# Patient Record
Sex: MALE | Race: ASIAN | NOT HISPANIC OR LATINO | URBAN - METROPOLITAN AREA
[De-identification: names, ages, dates, MRNs, and addresses within clinical notes are randomized per-mention and may not be internally consistent; named-entity substitution may affect disease eponyms.]

---

## 2024-02-10 ENCOUNTER — EMERGENCY (EMERGENCY)
Facility: HOSPITAL | Age: 64
LOS: 1 days | Discharge: HOME | End: 2024-02-10
Attending: INTERNAL MEDICINE | Admitting: INTERNAL MEDICINE
Payer: COMMERCIAL

## 2024-02-10 VITALS
WEIGHT: 220.02 LBS | OXYGEN SATURATION: 97 % | DIASTOLIC BLOOD PRESSURE: 94 MMHG | SYSTOLIC BLOOD PRESSURE: 147 MMHG | RESPIRATION RATE: 18 BRPM | HEART RATE: 90 BPM | TEMPERATURE: 98 F | HEIGHT: 74 IN

## 2024-02-10 PROCEDURE — 73562 X-RAY EXAM OF KNEE 3: CPT

## 2024-02-10 PROCEDURE — 99283 EMERGENCY DEPT VISIT LOW MDM: CPT | Mod: 25

## 2024-02-10 PROCEDURE — 99284 EMERGENCY DEPT VISIT MOD MDM: CPT

## 2024-02-10 PROCEDURE — 96372 THER/PROPH/DIAG INJ SC/IM: CPT

## 2024-02-10 PROCEDURE — 73562 X-RAY EXAM OF KNEE 3: CPT | Mod: 26,LT

## 2024-02-10 RX ORDER — KETOROLAC TROMETHAMINE 30 MG/ML
30 SYRINGE (ML) INJECTION ONCE
Refills: 0 | Status: DISCONTINUED | OUTPATIENT
Start: 2024-02-10 | End: 2024-02-10

## 2024-02-10 RX ADMIN — Medication 30 MILLIGRAM(S): at 02:26

## 2024-02-10 NOTE — ED PROVIDER NOTE - PATIENT PORTAL LINK FT
You can access the FollowMyHealth Patient Portal offered by [facility] by registering at the following website: [email or web address]. By joining [facility]’s FollowMyHealth portal, you will also be able to view your health information using other applications (apps) compatible with our system.

## 2024-02-10 NOTE — ED ADULT NURSE REASSESSMENT NOTE - NS ED NURSE REASSESS COMMENT FT1
Patient seen for left knee pain and swelling. The symptoms began yesterday, patient denies any trauma. Toradol and percocet administered with relief. Patient is discharge to home, he is able to ambulate. Knee immobilizer in place.

## 2024-02-10 NOTE — ED ADULT TRIAGE NOTE - CHIEF COMPLAINT QUOTE
Patient states that after work today he became unable to move his left knee.  Patient states knee is very painful.  Patient has a surgical scar and reports of multiple surgeries. Swelling noted to knee.

## 2024-02-10 NOTE — ED ADULT TRIAGE NOTE - WEIGHT IN KG
Pt given discharge teaching as well as prescription  Pt had no questions for the RN  Pt walked out to front door with RN and family members   Gavin Honeycutt RN
99.8

## 2024-02-10 NOTE — ED PROVIDER NOTE - CLINICAL SUMMARY MEDICAL DECISION MAKING FREE TEXT BOX
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

## 2024-02-10 NOTE — ED PROVIDER NOTE - CARE PROVIDER_API CALL
ambulatory Lazaro Kelly  Orthopaedic Surgery  [address], [address]  [place] [address]  Phone: [phone]  Fax: [phone]  Follow Up Time: 1-3 Days

## 2024-02-10 NOTE — ED ADULT TRIAGE NOTE - IDEAL BODY WEIGHT(KG)
82 A-T Advancement Flap Text: The defect edges were debeveled with a #15 scalpel blade.  Given the location of the defect, shape of the defect and the proximity to free margins an A-T advancement flap was deemed most appropriate.  Using a sterile surgical marker, an appropriate advancement flap was drawn incorporating the defect and placing the expected incisions within the relaxed skin tension lines where possible.    The area thus outlined was incised deep to adipose tissue with a #15 scalpel blade.  The skin margins were undermined to an appropriate distance in all directions utilizing iris scissors.

## 2024-02-10 NOTE — ED PROVIDER NOTE - OBJECTIVE STATEMENT
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

## 2024-02-10 NOTE — ED PROVIDER NOTE - NSFOLLOWUPINSTRUCTIONS_ED_ALL_ED_FT
Take Naprosyn 500mg twice daily  Follow up with your Knee surgeon  or the referral provided tomorrow

## 2024-02-16 ENCOUNTER — APPOINTMENT (OUTPATIENT)
Dept: ORTHOPEDIC SURGERY | Facility: CLINIC | Age: 64
End: 2024-02-16
Payer: COMMERCIAL

## 2024-02-16 VITALS
OXYGEN SATURATION: 98 % | HEART RATE: 65 BPM | SYSTOLIC BLOOD PRESSURE: 146 MMHG | HEIGHT: 74 IN | BODY MASS INDEX: 26.95 KG/M2 | DIASTOLIC BLOOD PRESSURE: 86 MMHG | WEIGHT: 210 LBS | TEMPERATURE: 97.8 F

## 2024-02-16 DIAGNOSIS — Z86.39 PERSONAL HISTORY OF OTHER ENDOCRINE, NUTRITIONAL AND METABOLIC DISEASE: ICD-10-CM

## 2024-02-16 DIAGNOSIS — M25.562 PAIN IN LEFT KNEE: ICD-10-CM

## 2024-02-16 DIAGNOSIS — Z78.9 OTHER SPECIFIED HEALTH STATUS: ICD-10-CM

## 2024-02-16 DIAGNOSIS — M79.641 PAIN IN RIGHT HAND: ICD-10-CM

## 2024-02-16 PROBLEM — Z00.00 ENCOUNTER FOR PREVENTIVE HEALTH EXAMINATION: Status: ACTIVE | Noted: 2024-02-16

## 2024-02-16 PROCEDURE — 73130 X-RAY EXAM OF HAND: CPT | Mod: RT

## 2024-02-16 PROCEDURE — 73564 X-RAY EXAM KNEE 4 OR MORE: CPT | Mod: LT

## 2024-02-16 PROCEDURE — 99204 OFFICE O/P NEW MOD 45 MIN: CPT

## 2024-02-16 RX ORDER — ATORVASTATIN CALCIUM 10 MG/1
10 TABLET, FILM COATED ORAL
Refills: 0 | Status: ACTIVE | COMMUNITY

## 2024-02-16 RX ORDER — OMEPRAZOLE 20 MG/1
20 CAPSULE, DELAYED RELEASE ORAL
Refills: 0 | Status: ACTIVE | COMMUNITY

## 2024-02-16 NOTE — HISTORY OF PRESENT ILLNESS
[de-identified] : 63-year-old male  presents for initial evaluation of left knee pain x 1 week. Denies trauma or injury. He complains of constant aching pain worse with prolonged walking, standing, bending the knee, sitting, and climbing stairs. He takes Tylenol for pain with some relief. He was advised by his PCP to avoid NSAIDs. He went to [facility] and had x-rays which were negative. He followed up with the office of his prior surgeon and had the knee aspirated. He has a history of left TKR in 2021 with Dr. Esparza.  He had some sort of revision procedure in 2022.  He is unsure what was done at that surgery but states that he has had pain in his knee since.  Earlier this week he was seen by Dr. Esparza and had bloody fluid aspirated from his knee.  He does not have results of that aspiration. He is also complaining of pain in his right hand without any injury.  The pain is diffusely in the metacarpal region.  He denies numbness or tingling or prior difficulty with his hand.

## 2024-02-16 NOTE — PHYSICAL EXAM
[UE/LE] : Sensory: Intact in bilateral upper & lower extremities [DP] : dorsalis pedis 2+ and symmetric bilaterally [PT] : posterior tibial 2+ and symmetric bilaterally [Rad] : radial 2+ and symmetric bilaterally [Normal] : Alert and in no acute distress [Poor Appearance] : well-appearing [Acute Distress] : not in acute distress [Obese] : not obese [de-identified] : The patient has no respiratory distress. Mood and affect are normal. The patient is alert and oriented to person, place and time. There is no pain with motion of the shoulders or the elbows.  Examination of the right hand demonstrates no tenderness, no swelling and no deformity.  Tendon function is intact.  Sensory exam is intact.  He reports mild pain with wrist range of motion. Lower extremity examination demonstrates no pain with motion of the hips.  There is no tenderness of either hip.  Examination of the knees demonstrates scar on the anterior aspect of the left knee.  There is no drainage or evidence of infection.  There is no instability.  Quadriceps function is intact.  Range of motion 0 to 100 degrees on the left.  The calves are soft and nontender.  The skin is intact.  There is no lymphedema. [de-identified] : ACC: [id number] EXAM: XR KNEE AP LAT OBL 3 VIEWS LT ORDERED BY: NISREEN MCKEON PROCEDURE DATE: 02/10/2024 INTERPRETATION: Clinical history: Pain and swelling  Left knee 3 views  Left total knee replacement is seen. No acute fracture or dislocation. Joint effusion is present. Generalized soft tissue swelling is seen.  IMPRESSION: Soft tissue swelling with joint effusion  AJ JAMES MD; Attending Radiologist This document has been electronically signed. Feb 10 2024 11:43AM  AP, lateral and sunrise x-rays of the left knee taken today demonstrate total knee replacement in good position.  There is no obvious evidence of loosening.  Previously taken x-rays are not available for my review. AP, lateral and oblique x-rays of the right hand demonstrate no fracture, no dislocation and no bony abnormality.

## 2024-02-16 NOTE — DISCUSSION/SUMMARY
[de-identified] : The patient is having pain in the left knee following knee replacement.  He initially was very comfortable but at the second procedure he has developed pain.  It is unclear what the second procedure was.  He had his knee aspirated by the treating surgeon earlier this week and we do not yet have results from that aspiration.  I have advised him that I am not sure of the cause of his pain but we will speak to him once we get results of this weeks aspiration.  In terms of his hand I find no pathology.  He likely had a sprain from overuse.  We will speak with him once further information has been obtained.

## 2024-03-03 ENCOUNTER — EMERGENCY (EMERGENCY)
Facility: HOSPITAL | Age: 64
LOS: 1 days | Discharge: HOME | End: 2024-03-03
Attending: STUDENT IN AN ORGANIZED HEALTH CARE EDUCATION/TRAINING PROGRAM | Admitting: STUDENT IN AN ORGANIZED HEALTH CARE EDUCATION/TRAINING PROGRAM
Payer: COMMERCIAL

## 2024-03-03 VITALS
DIASTOLIC BLOOD PRESSURE: 94 MMHG | TEMPERATURE: 97 F | HEART RATE: 77 BPM | WEIGHT: 210.1 LBS | RESPIRATION RATE: 18 BRPM | OXYGEN SATURATION: 94 % | HEIGHT: 74 IN | SYSTOLIC BLOOD PRESSURE: 139 MMHG

## 2024-03-03 VITALS
TEMPERATURE: 98 F | RESPIRATION RATE: 18 BRPM | SYSTOLIC BLOOD PRESSURE: 156 MMHG | OXYGEN SATURATION: 98 % | HEART RATE: 62 BPM | DIASTOLIC BLOOD PRESSURE: 91 MMHG

## 2024-03-03 LAB
ALBUMIN SERPL ELPH-MCNC: 3.2 G/DL — LOW (ref 3.3–5)
ALP SERPL-CCNC: 84 U/L — SIGNIFICANT CHANGE UP (ref 40–120)
ALT FLD-CCNC: 26 U/L — SIGNIFICANT CHANGE UP (ref 12–78)
ANION GAP SERPL CALC-SCNC: 8 MMOL/L — SIGNIFICANT CHANGE UP (ref 5–17)
APTT BLD: 34.1 SEC — SIGNIFICANT CHANGE UP (ref 24.5–35.6)
AST SERPL-CCNC: 27 U/L — SIGNIFICANT CHANGE UP (ref 15–37)
BASOPHILS # BLD AUTO: 0.05 K/UL — SIGNIFICANT CHANGE UP (ref 0–0.2)
BASOPHILS NFR BLD AUTO: 0.7 % — SIGNIFICANT CHANGE UP (ref 0–2)
BILIRUB SERPL-MCNC: 0.9 MG/DL — SIGNIFICANT CHANGE UP (ref 0.2–1.2)
BUN SERPL-MCNC: 13 MG/DL — SIGNIFICANT CHANGE UP (ref 7–23)
CALCIUM SERPL-MCNC: 8.6 MG/DL — SIGNIFICANT CHANGE UP (ref 8.5–10.1)
CHLORIDE SERPL-SCNC: 112 MMOL/L — HIGH (ref 96–108)
CO2 SERPL-SCNC: 23 MMOL/L — SIGNIFICANT CHANGE UP (ref 22–31)
CREAT SERPL-MCNC: 1.1 MG/DL — SIGNIFICANT CHANGE UP (ref 0.5–1.3)
D DIMER BLD IA.RAPID-MCNC: 2192 NG/ML DDU — HIGH
EGFR: 76 ML/MIN/1.73M2 — SIGNIFICANT CHANGE UP
EOSINOPHIL # BLD AUTO: 0.19 K/UL — SIGNIFICANT CHANGE UP (ref 0–0.5)
EOSINOPHIL NFR BLD AUTO: 2.5 % — SIGNIFICANT CHANGE UP (ref 0–6)
GLUCOSE SERPL-MCNC: 94 MG/DL — SIGNIFICANT CHANGE UP (ref 70–99)
HCT VFR BLD CALC: 47.2 % — SIGNIFICANT CHANGE UP (ref 39–50)
HGB BLD-MCNC: 16 G/DL — SIGNIFICANT CHANGE UP (ref 13–17)
IMM GRANULOCYTES NFR BLD AUTO: 0.3 % — SIGNIFICANT CHANGE UP (ref 0–0.9)
INR BLD: 0.93 RATIO — SIGNIFICANT CHANGE UP (ref 0.85–1.18)
LYMPHOCYTES # BLD AUTO: 2.63 K/UL — SIGNIFICANT CHANGE UP (ref 1–3.3)
LYMPHOCYTES # BLD AUTO: 34.6 % — SIGNIFICANT CHANGE UP (ref 13–44)
MCHC RBC-ENTMCNC: 30.1 PG — SIGNIFICANT CHANGE UP (ref 27–34)
MCHC RBC-ENTMCNC: 33.9 GM/DL — SIGNIFICANT CHANGE UP (ref 32–36)
MCV RBC AUTO: 88.7 FL — SIGNIFICANT CHANGE UP (ref 80–100)
MONOCYTES # BLD AUTO: 0.75 K/UL — SIGNIFICANT CHANGE UP (ref 0–0.9)
MONOCYTES NFR BLD AUTO: 9.9 % — SIGNIFICANT CHANGE UP (ref 2–14)
NEUTROPHILS # BLD AUTO: 3.96 K/UL — SIGNIFICANT CHANGE UP (ref 1.8–7.4)
NEUTROPHILS NFR BLD AUTO: 52 % — SIGNIFICANT CHANGE UP (ref 43–77)
NRBC # BLD: 0 /100 WBCS — SIGNIFICANT CHANGE UP (ref 0–0)
PLATELET # BLD AUTO: 298 K/UL — SIGNIFICANT CHANGE UP (ref 150–400)
POTASSIUM SERPL-MCNC: 4 MMOL/L — SIGNIFICANT CHANGE UP (ref 3.5–5.3)
POTASSIUM SERPL-SCNC: 4 MMOL/L — SIGNIFICANT CHANGE UP (ref 3.5–5.3)
PROT SERPL-MCNC: 7.4 G/DL — SIGNIFICANT CHANGE UP (ref 6–8.3)
PROTHROM AB SERPL-ACNC: 10.9 SEC — SIGNIFICANT CHANGE UP (ref 9.5–13)
RBC # BLD: 5.32 M/UL — SIGNIFICANT CHANGE UP (ref 4.2–5.8)
RBC # FLD: 13.5 % — SIGNIFICANT CHANGE UP (ref 10.3–14.5)
SODIUM SERPL-SCNC: 143 MMOL/L — SIGNIFICANT CHANGE UP (ref 135–145)
TROPONIN I, HIGH SENSITIVITY RESULT: 19.8 NG/L — SIGNIFICANT CHANGE UP
TROPONIN I, HIGH SENSITIVITY RESULT: 19.8 NG/L — SIGNIFICANT CHANGE UP
WBC # BLD: 7.6 K/UL — SIGNIFICANT CHANGE UP (ref 3.8–10.5)
WBC # FLD AUTO: 7.6 K/UL — SIGNIFICANT CHANGE UP (ref 3.8–10.5)

## 2024-03-03 PROCEDURE — 93005 ELECTROCARDIOGRAM TRACING: CPT

## 2024-03-03 PROCEDURE — 93970 EXTREMITY STUDY: CPT | Mod: 26

## 2024-03-03 PROCEDURE — 93010 ELECTROCARDIOGRAM REPORT: CPT

## 2024-03-03 PROCEDURE — 84484 ASSAY OF TROPONIN QUANT: CPT

## 2024-03-03 PROCEDURE — 99285 EMERGENCY DEPT VISIT HI MDM: CPT | Mod: 25

## 2024-03-03 PROCEDURE — 71275 CT ANGIOGRAPHY CHEST: CPT | Mod: 26,MC

## 2024-03-03 PROCEDURE — 80053 COMPREHEN METABOLIC PANEL: CPT

## 2024-03-03 PROCEDURE — 36415 COLL VENOUS BLD VENIPUNCTURE: CPT

## 2024-03-03 PROCEDURE — 71275 CT ANGIOGRAPHY CHEST: CPT | Mod: MC

## 2024-03-03 PROCEDURE — 85025 COMPLETE CBC W/AUTO DIFF WBC: CPT

## 2024-03-03 PROCEDURE — 93970 EXTREMITY STUDY: CPT

## 2024-03-03 PROCEDURE — 99285 EMERGENCY DEPT VISIT HI MDM: CPT

## 2024-03-03 PROCEDURE — 71045 X-RAY EXAM CHEST 1 VIEW: CPT

## 2024-03-03 PROCEDURE — 85730 THROMBOPLASTIN TIME PARTIAL: CPT

## 2024-03-03 PROCEDURE — 96374 THER/PROPH/DIAG INJ IV PUSH: CPT | Mod: XU

## 2024-03-03 PROCEDURE — 85379 FIBRIN DEGRADATION QUANT: CPT

## 2024-03-03 PROCEDURE — 71045 X-RAY EXAM CHEST 1 VIEW: CPT | Mod: 26

## 2024-03-03 PROCEDURE — 85610 PROTHROMBIN TIME: CPT

## 2024-03-03 RX ORDER — KETOROLAC TROMETHAMINE 30 MG/ML
30 SYRINGE (ML) INJECTION ONCE
Refills: 0 | Status: DISCONTINUED | OUTPATIENT
Start: 2024-03-03 | End: 2024-03-03

## 2024-03-03 RX ORDER — SODIUM CHLORIDE 9 MG/ML
1000 INJECTION INTRAMUSCULAR; INTRAVENOUS; SUBCUTANEOUS ONCE
Refills: 0 | Status: COMPLETED | OUTPATIENT
Start: 2024-03-03 | End: 2024-03-03

## 2024-03-03 RX ADMIN — SODIUM CHLORIDE 1000 MILLILITER(S): 9 INJECTION INTRAMUSCULAR; INTRAVENOUS; SUBCUTANEOUS at 14:09

## 2024-03-03 RX ADMIN — Medication 30 MILLIGRAM(S): at 16:17

## 2024-03-03 NOTE — ED ADULT NURSE NOTE - OBJECTIVE STATEMENT
63yr old male a&ox4 arrives to ED from home c/o chest pain, pt notes pain to be consent with no relief, pt notes no cardiac history and that chest pain woke pt up out of sleep. pt notes active sob, EKG completed, iv placed. Pt placed on bedside cardiac monitor. Magaly LANGE

## 2024-03-03 NOTE — ED PROVIDER NOTE - PATIENT PORTAL LINK FT
You can access the FollowMyHealth Patient Portal offered by [facility] by registering at the following website: [email or web address]. By joining [facility]’s FollowMyHealth portal, you will also be able to view your health information using other applications (apps) compatible with our system.

## 2024-03-03 NOTE — ED PROVIDER NOTE - NSFOLLOWUPINSTRUCTIONS_ED_ALL_ED_FT
Follow-up with your PCP for reevaluation, ongoing care and treatment. Follow up for further workup and evaluation of incidental findings on ct scan.  Follow-up with cardiologist.  Take Tylenol or Motrin with food as directed for pain.  If having worsening symptoms or other related symptoms, return to the ER immediately.    Nonspecific Chest Pain, Adult  Chest pain is an uncomfortable, tight, or painful feeling in the chest. The pain can feel like a crushing, aching, or squeezing pressure. A person can feel a burning or tingling sensation. Chest pain can also be felt in your back, neck, jaw, shoulder, or arm. This pain can be worse when you move, sneeze, or take a deep breath.    Chest pain can be caused by a condition that is life-threatening. This must be treated right away. It can also be caused by something that is not life-threatening. If you have chest pain, it can be hard to know the difference, so it is important to get help right away to make sure that you do not have a serious condition.    Some life-threatening causes of chest pain include:  Heart attack.  A tear in the body's main blood vessel (aortic dissection).  Inflammation around your heart (pericarditis).  A problem in the lungs, such as a blood clot (pulmonary embolism) or a collapsed lung (pneumothorax).  Some non life-threatening causes of chest pain include:  Heartburn.  Anxiety or stress.  Damage to the bones, muscles, and cartilage that make up your chest wall.  Pneumonia or bronchitis.  Shingles infection (varicella-zoster virus).  Your chest pain may come and go. It may also be constant. Your health care provider will do tests and other studies to find the cause of your pain. Treatment will depend on the cause of your chest pain.    Follow these instructions at home:  Medicines    Take over-the-counter and prescription medicines only as told by your health care provider.  If you were prescribed an antibiotic medicine, take it as told by your health care provider. Do not stop taking the antibiotic even if you start to feel better.  Activity    Avoid any activities that cause chest pain.  Do not lift anything that is heavier than 10 lb (4.5 kg), or the limit that you are told, until your health care provider says that it is safe.  Rest as directed by your health care provider.  Return to your normal activities only as told by your health care provider. Ask your health care provider what activities are safe for you.  Lifestyle    A plate along with examples of foods in a healthy diet.  Silhouette of a person sitting on the floor doing yoga.  Do not use any products that contain nicotine or tobacco, such as cigarettes, e-cigarettes, and chewing tobacco. If you need help quitting, ask your health care provider.  Do not drink alcohol.  Make healthy lifestyle changes as recommended. These may include:  Getting regular exercise. Ask your health care provider to suggest some exercises that are safe for you.  Eating a heart-healthy diet. This includes plenty of fresh fruits and vegetables, whole grains, low-fat (lean) protein, and low-fat dairy products. A dietitian can help you find healthy eating options.  Maintaining a healthy weight.  Managing any other health conditions you may have, such as high blood pressure (hypertension) or diabetes.  Reducing stress, such as with yoga or relaxation techniques.  General instructions    Pay attention to any changes in your symptoms.  It is up to you to get the results of any tests that were done. Ask your health care provider, or the department that is doing the tests, when your results will be ready.  Keep all follow-up visits as told by your health care provider. This is important.  You may be asked to go for further testing if your chest pain does not go away.  Contact a health care provider if:  Your chest pain does not go away.  You feel depressed.  You have a fever.  You notice changes in your symptoms or develop new symptoms.  Get help right away if:  Your chest pain gets worse.  You have a cough that gets worse, or you cough up blood.  You have severe pain in your abdomen.  You faint.  You have sudden, unexplained chest discomfort.  You have sudden, unexplained discomfort in your arms, back, neck, or jaw.  You have shortness of breath at any time.  You suddenly start to sweat, or your skin gets clammy.  You feel nausea or you vomit.  You suddenly feel lightheaded or dizzy.  You have severe weakness, or unexplained weakness or fatigue.  Your heart begins to beat quickly, or it feels like it is skipping beats.  These symptoms may represent a serious problem that is an emergency. Do not wait to see if the symptoms will go away. Get medical help right away. Call your local emergency services (911 in the [place] Do not drive yourself to the hospital.    Summary  Chest pain can be caused by a condition that is serious and requires urgent treatment. It may also be caused by something that is not life-threatening.  Your health care provider may do lab tests and other studies to find the cause of your pain.  Follow your health care provider's instructions on taking medicines, making lifestyle changes, and getting emergency treatment if symptoms become worse.  Keep all follow-up visits as told by your health care provider. This includes visits for any further testing if your chest pain does not go away.

## 2024-03-03 NOTE — ED ADULT TRIAGE NOTE - GLASGOW COMA SCALE: EYE OPENING, MLM
Pulmonary Progress Note  CC: COVID, COPD    Subjective:  Pt did not use Bipap all of the prior night  Episode of hypoxia this morning. O2 increased to 7lpm    EXAM: BP (!) 184/78   Pulse 90   Temp 98 °F (36.7 °C) (Axillary)   Resp 18   Wt 229 lb 1.6 oz (103.9 kg)   SpO2 91%   BMI 36.98 kg/m²  on 7L  Constitutional:  No acute distress. Eyes: PERRL. Conjunctivae anicteric. ENT: Normal nose. Normal tongue. Neck:  Trachea is midline. No thyroid tenderness. Respiratory: No accessory muscle usage. decreased breath sounds. No wheezes. + rales. No Rhonchi. Cardiovascular: Normal S1S2. No digit clubbing. No digit cyanosis. No LE edema. Psychiatric: No anxiety or Agitation. Alert and Oriented to person, place and time.     Scheduled Meds:   gabapentin  400 mg Oral BID    guaiFENesin  600 mg Oral BID    apixaban  5 mg Oral BID    dilTIAZem  120 mg Oral Daily    ferrous sulfate  325 mg Oral Daily with breakfast    pantoprazole  40 mg Oral QAM AC    therapeutic multivitamin-minerals  1 tablet Oral Daily    pravastatin  10 mg Oral QPM    QUEtiapine  400 mg Oral Nightly    rOPINIRole  4 mg Oral Nightly    torsemide  20 mg Oral Daily    sodium chloride flush  5-40 mL IntraVENous 2 times per day    predniSONE  40 mg Oral Daily    albuterol sulfate HFA  2 puff Inhalation 4x daily    And    ipratropium  2 puff Inhalation 4x daily    venlafaxine  225 mg Oral Daily    azithromycin  250 mg Oral Daily     Continuous Infusions:   sodium chloride       PRN Meds:  ALPRAZolam, HYDROcodone-acetaminophen, guaiFENesin-dextromethorphan, sodium chloride flush, sodium chloride, polyethylene glycol, acetaminophen **OR** acetaminophen, prochlorperazine, albuterol sulfate HFA, magnesium sulfate    Labs:  CBC:   Recent Labs     02/05/23 2153 02/06/23 0417   WBC 5.3 5.2   HGB 11.5* 11.3*   HCT 34.8* 34.7*   MCV 91.4 95.6    267       BMP:   Recent Labs     02/05/23 2153 02/06/23 0417    134*   K 4.2 3.6   CL 93* 95* CO2 32 26   BUN 10 10   CREATININE 0.6 0.6       Chest imaging was reviewed by me and showed 2/5/23 CXR  Chronic airspace changes have slightly progressed in the right lung suspected   to represent pleural thickening and pleural fluid along with edema versus   atelectasis. Lesser pattern of interstitial change slightly increased on the   left. A superimposed viral pattern of pneumonitis may be considered   clinically. ASSESSMENT:  Acute on chronic hypoxic respiratory failure   COPD exacerbation  COVID 19 disease  MEG on home Bipap     PLAN:  Droplet Plus Airborne Precautions   Bipap PRN and QHS - pt told she can use home unit if available  Supplemental oxygen to maintain SaO2 >92%; wean as tolerated  Intensive inhaled bronchodilator therapy. Change IV to prednisone taper  Zpack  Sputum GS&C. (E4) spontaneous

## 2024-03-03 NOTE — ED PROVIDER NOTE - CARE PROVIDER_API CALL
John Montoya  Cardiology  [address] [place] [address]  Phone: [phone]  Fax: [phone]  Follow Up Time: 1-3 Days    LESTER NICOLE  [address] [place] [address]  Phone: ([phone]  Fax: ()-  Follow Up Time: 1-3 Days

## 2024-03-03 NOTE — ED PROVIDER NOTE - OBJECTIVE STATEMENT
63-year-old male with history of hyperlipidemia on aspirin presents with complaint of chest pain since 2 AM today.  Patient states that he has had nonradiating constant midsternal chest pressure, worse with certain movements and exertion since 2am today. Took dose of tylenol at 2:30am without relief.  Denies fever, cough, shortness of breath, palpitations, numbness, tingling, N/V, abdominal pain, calf pain/swelling, recent travel/immobilization, hx of dvt/pe or other symptoms.  pcp: Dr. Kehinde Ruiz

## 2024-03-03 NOTE — ED PROVIDER NOTE - CLINICAL SUMMARY MEDICAL DECISION MAKING FREE TEXT BOX
64yo M otherwise healthy pw midsternal chest pain and pressure since 2am this morning waking him from sleep. no assoc sob, nausea, vomiting or other symptoms. pt states pain is worse when he twists his torso from side to side and when he walks. does not radiate anywhere.   pt well appearing, ekg no acute ischemic cahnges, will check labs, trop ro acs, ro pna, ro ptx, labs,  trop, xray  reassess

## 2024-03-03 NOTE — ED PROVIDER NOTE - MUSCULOSKELETAL, MLM
Spine appears normal, range of motion is not limited, no muscle or joint tenderness, calf/LE B NT without swelling

## 2024-03-03 NOTE — ED PROVIDER NOTE - CARE PROVIDERS DIRECT ADDRESSES
,[email or web address],DirectAddress_Unknown

## 2024-03-03 NOTE — ED PROVIDER NOTE - PROVIDER TOKENS
PROVIDER:[TOKEN:[[id number]],FOLLOWUP:[1-3 Days]],PROVIDER:[TOKEN:[[id number]],FOLLOWUP:[1-3 Days]]

## 2024-03-03 NOTE — ED PROVIDER NOTE - PROGRESS NOTE DETAILS
Reevaluated patient at bedside.  Patient feeling much improved and pain subsided after toradol. advised to f/u with pcp and cards. Informed about incidental findings on ct scan and advised to f/u with pcp for further workup and eval. Discussed the results of all diagnostic testing in ED and copies of all reports given.   An opportunity to ask questions was given.  Discussed the importance of prompt, close medical follow-up.  Patient will return with any changes, concerns or persistent / worsening symptoms.  Understanding of all instructions verbalized.

## 2024-05-22 ENCOUNTER — EMERGENCY (EMERGENCY)
Facility: HOSPITAL | Age: 64
LOS: 1 days | Discharge: HOME | End: 2024-05-22
Attending: EMERGENCY MEDICINE | Admitting: EMERGENCY MEDICINE
Payer: COMMERCIAL

## 2024-05-22 VITALS
SYSTOLIC BLOOD PRESSURE: 160 MMHG | WEIGHT: 214.95 LBS | HEIGHT: 74 IN | OXYGEN SATURATION: 96 % | DIASTOLIC BLOOD PRESSURE: 104 MMHG | TEMPERATURE: 98 F | HEART RATE: 84 BPM | RESPIRATION RATE: 18 BRPM

## 2024-05-22 PROCEDURE — 93010 ELECTROCARDIOGRAM REPORT: CPT

## 2024-05-22 PROCEDURE — 99053 MED SERV 10PM-8AM 24 HR FAC: CPT

## 2024-05-22 PROCEDURE — 99285 EMERGENCY DEPT VISIT HI MDM: CPT

## 2024-05-23 LAB
ALBUMIN SERPL ELPH-MCNC: 3.1 G/DL — LOW (ref 3.3–5)
ALP SERPL-CCNC: 97 U/L — SIGNIFICANT CHANGE UP (ref 40–120)
ALT FLD-CCNC: 30 U/L — SIGNIFICANT CHANGE UP (ref 12–78)
ANION GAP SERPL CALC-SCNC: 5 MMOL/L — SIGNIFICANT CHANGE UP (ref 5–17)
APTT BLD: 33.6 SEC — SIGNIFICANT CHANGE UP (ref 24.5–35.6)
AST SERPL-CCNC: 21 U/L — SIGNIFICANT CHANGE UP (ref 15–37)
BASOPHILS # BLD AUTO: 0.05 K/UL — SIGNIFICANT CHANGE UP (ref 0–0.2)
BASOPHILS NFR BLD AUTO: 0.5 % — SIGNIFICANT CHANGE UP (ref 0–2)
BILIRUB SERPL-MCNC: 0.6 MG/DL — SIGNIFICANT CHANGE UP (ref 0.2–1.2)
BUN SERPL-MCNC: 16 MG/DL — SIGNIFICANT CHANGE UP (ref 7–23)
CALCIUM SERPL-MCNC: 8.8 MG/DL — SIGNIFICANT CHANGE UP (ref 8.5–10.1)
CHLORIDE SERPL-SCNC: 106 MMOL/L — SIGNIFICANT CHANGE UP (ref 96–108)
CO2 SERPL-SCNC: 26 MMOL/L — SIGNIFICANT CHANGE UP (ref 22–31)
CREAT SERPL-MCNC: 1.2 MG/DL — SIGNIFICANT CHANGE UP (ref 0.5–1.3)
EGFR: 68 ML/MIN/1.73M2 — SIGNIFICANT CHANGE UP
EOSINOPHIL # BLD AUTO: 0.13 K/UL — SIGNIFICANT CHANGE UP (ref 0–0.5)
EOSINOPHIL NFR BLD AUTO: 1.3 % — SIGNIFICANT CHANGE UP (ref 0–6)
GLUCOSE SERPL-MCNC: 99 MG/DL — SIGNIFICANT CHANGE UP (ref 70–99)
HCT VFR BLD CALC: 43.3 % — SIGNIFICANT CHANGE UP (ref 39–50)
HGB BLD-MCNC: 14.6 G/DL — SIGNIFICANT CHANGE UP (ref 13–17)
IMM GRANULOCYTES NFR BLD AUTO: 0.2 % — SIGNIFICANT CHANGE UP (ref 0–0.9)
INR BLD: 0.97 RATIO — SIGNIFICANT CHANGE UP (ref 0.85–1.18)
LIDOCAIN IGE QN: 33 U/L — SIGNIFICANT CHANGE UP (ref 13–75)
LYMPHOCYTES # BLD AUTO: 3.17 K/UL — SIGNIFICANT CHANGE UP (ref 1–3.3)
LYMPHOCYTES # BLD AUTO: 30.8 % — SIGNIFICANT CHANGE UP (ref 13–44)
MCHC RBC-ENTMCNC: 29.8 PG — SIGNIFICANT CHANGE UP (ref 27–34)
MCHC RBC-ENTMCNC: 33.7 GM/DL — SIGNIFICANT CHANGE UP (ref 32–36)
MCV RBC AUTO: 88.4 FL — SIGNIFICANT CHANGE UP (ref 80–100)
MONOCYTES # BLD AUTO: 1.04 K/UL — HIGH (ref 0–0.9)
MONOCYTES NFR BLD AUTO: 10.1 % — SIGNIFICANT CHANGE UP (ref 2–14)
NEUTROPHILS # BLD AUTO: 5.87 K/UL — SIGNIFICANT CHANGE UP (ref 1.8–7.4)
NEUTROPHILS NFR BLD AUTO: 57.1 % — SIGNIFICANT CHANGE UP (ref 43–77)
NRBC # BLD: 0 /100 WBCS — SIGNIFICANT CHANGE UP (ref 0–0)
NT-PROBNP SERPL-SCNC: 58 PG/ML — SIGNIFICANT CHANGE UP (ref 0–125)
PLATELET # BLD AUTO: 396 K/UL — SIGNIFICANT CHANGE UP (ref 150–400)
POTASSIUM SERPL-MCNC: 3.4 MMOL/L — LOW (ref 3.5–5.3)
POTASSIUM SERPL-SCNC: 3.4 MMOL/L — LOW (ref 3.5–5.3)
PROT SERPL-MCNC: 7.3 G/DL — SIGNIFICANT CHANGE UP (ref 6–8.3)
PROTHROM AB SERPL-ACNC: 11.4 SEC — SIGNIFICANT CHANGE UP (ref 9.5–13)
RBC # BLD: 4.9 M/UL — SIGNIFICANT CHANGE UP (ref 4.2–5.8)
RBC # FLD: 13 % — SIGNIFICANT CHANGE UP (ref 10.3–14.5)
SODIUM SERPL-SCNC: 137 MMOL/L — SIGNIFICANT CHANGE UP (ref 135–145)
TROPONIN I, HIGH SENSITIVITY RESULT: 16.6 NG/L — SIGNIFICANT CHANGE UP
TROPONIN I, HIGH SENSITIVITY RESULT: 17.4 NG/L — SIGNIFICANT CHANGE UP
WBC # BLD: 10.28 K/UL — SIGNIFICANT CHANGE UP (ref 3.8–10.5)
WBC # FLD AUTO: 10.28 K/UL — SIGNIFICANT CHANGE UP (ref 3.8–10.5)

## 2024-05-23 PROCEDURE — 85610 PROTHROMBIN TIME: CPT

## 2024-05-23 PROCEDURE — 84484 ASSAY OF TROPONIN QUANT: CPT

## 2024-05-23 PROCEDURE — 71045 X-RAY EXAM CHEST 1 VIEW: CPT

## 2024-05-23 PROCEDURE — 99285 EMERGENCY DEPT VISIT HI MDM: CPT | Mod: 25

## 2024-05-23 PROCEDURE — 85025 COMPLETE CBC W/AUTO DIFF WBC: CPT

## 2024-05-23 PROCEDURE — 80053 COMPREHEN METABOLIC PANEL: CPT

## 2024-05-23 PROCEDURE — 96374 THER/PROPH/DIAG INJ IV PUSH: CPT

## 2024-05-23 PROCEDURE — 83690 ASSAY OF LIPASE: CPT

## 2024-05-23 PROCEDURE — 85730 THROMBOPLASTIN TIME PARTIAL: CPT

## 2024-05-23 PROCEDURE — 83880 ASSAY OF NATRIURETIC PEPTIDE: CPT

## 2024-05-23 PROCEDURE — 36415 COLL VENOUS BLD VENIPUNCTURE: CPT

## 2024-05-23 PROCEDURE — 71045 X-RAY EXAM CHEST 1 VIEW: CPT | Mod: 26

## 2024-05-23 PROCEDURE — 93005 ELECTROCARDIOGRAM TRACING: CPT

## 2024-05-23 RX ORDER — KETOROLAC TROMETHAMINE 30 MG/ML
30 SYRINGE (ML) INJECTION ONCE
Refills: 0 | Status: DISCONTINUED | OUTPATIENT
Start: 2024-05-23 | End: 2024-05-23

## 2024-05-23 RX ORDER — OXYCODONE AND ACETAMINOPHEN 5; 325 MG/1; MG/1
1 TABLET ORAL
Qty: 12 | Refills: 0
Start: 2024-05-23 | End: 2024-05-25

## 2024-05-23 RX ADMIN — Medication 30 MILLIGRAM(S): at 01:00

## 2024-05-23 RX ADMIN — Medication 30 MILLIGRAM(S): at 00:24

## 2024-05-23 NOTE — ED PROVIDER NOTE - NSFOLLOWUPINSTRUCTIONS_ED_ALL_ED_FT
1) Follow-up with your cardiologist as scheduled on Friday 3pm. Call today / next business day for prompt follow-up.  2) Return to Emergency room for any worsening or persistent pain, weakness, fever, or any other concerning symptoms.  3) See attached instruction sheets for additional information, including information regarding signs and symptoms to look out for, reasons to seek immediate care and other important instructions.  4) Follow-up with any specialists as discussed / noted as well.     Chest pain is an uncomfortable, tight, or painful feeling in the chest. The pain can feel like a crushing, aching, or squeezing pressure. A person can feel a burning or tingling sensation. Chest pain can also be felt in your back, neck, jaw, shoulder, or arm. This pain can be worse when you move, sneeze, or take a deep breath.    Chest pain can be caused by a condition that is life-threatening. This must be treated right away. It can also be caused by something that is not life-threatening. If you have chest pain, it can be hard to know the difference, so it is important to get help right away to make sure that you do not have a serious condition.    Some life-threatening causes of chest pain include:  Heart attack.  A tear in the body's main blood vessel (aortic dissection).  Inflammation around your heart (pericarditis).  A problem in the lungs, such as a blood clot (pulmonary embolism) or a collapsed lung (pneumothorax).  Some non life-threatening causes of chest pain include:  Heartburn.  Anxiety or stress.  Damage to the bones, muscles, and cartilage that make up your chest wall.  Pneumonia or bronchitis.  Shingles infection (varicella-zoster virus).  Your chest pain may come and go. It may also be constant. Your health care provider will do tests and other studies to find the cause of your pain. Treatment will depend on the cause of your chest pain.    Follow these instructions at home:  Medicines    Take over-the-counter and prescription medicines only as told by your health care provider.  If you were prescribed an antibiotic medicine, take it as told by your health care provider. Do not stop taking the antibiotic even if you start to feel better.  Activity    Avoid any activities that cause chest pain.  Do not lift anything that is heavier than 10 lb (4.5 kg), or the limit that you are told, until your health care provider says that it is safe.  Rest as directed by your health care provider.  Return to your normal activities only as told by your health care provider. Ask your health care provider what activities are safe for you.  Lifestyle    A plate along with examples of foods in a healthy diet.  Silhouette of a person sitting on the floor doing yoga.  Do not use any products that contain nicotine or tobacco, such as cigarettes, e-cigarettes, and chewing tobacco. If you need help quitting, ask your health care provider.  Do not drink alcohol.  Make healthy lifestyle changes as recommended. These may include:  Getting regular exercise. Ask your health care provider to suggest some exercises that are safe for you.  Eating a heart-healthy diet. This includes plenty of fresh fruits and vegetables, whole grains, low-fat (lean) protein, and low-fat dairy products. A dietitian can help you find healthy eating options.  Maintaining a healthy weight.  Managing any other health conditions you may have, such as high blood pressure (hypertension) or diabetes.  Reducing stress, such as with yoga or relaxation techniques.  General instructions    Pay attention to any changes in your symptoms.  It is up to you to get the results of any tests that were done. Ask your health care provider, or the department that is doing the tests, when your results will be ready.  Keep all follow-up visits as told by your health care provider. This is important.  You may be asked to go for further testing if your chest pain does not go away.  Contact a health care provider if:  Your chest pain does not go away.  You feel depressed.  You have a fever.  You notice changes in your symptoms or develop new symptoms.  Get help right away if:  Your chest pain gets worse.  You have a cough that gets worse, or you cough up blood.  You have severe pain in your abdomen.  You faint.  You have sudden, unexplained chest discomfort.  You have sudden, unexplained discomfort in your arms, back, neck, or jaw.  You have shortness of breath at any time.  You suddenly start to sweat, or your skin gets clammy.  You feel nausea or you vomit.  You suddenly feel lightheaded or dizzy.  You have severe weakness, or unexplained weakness or fatigue.  Your heart begins to beat quickly, or it feels like it is skipping beats.  These symptoms may represent a serious problem that is an emergency. Do not wait to see if the symptoms will go away. Get medical help right away. Call your local emergency services (911 in the [place] Do not drive yourself to the hospital.    Summary  Chest pain can be caused by a condition that is serious and requires urgent treatment. It may also be caused by something that is not life-threatening.  Your health care provider may do lab tests and other studies to find the cause of your pain.  Follow your health care provider's instructions on taking medicines, making lifestyle changes, and getting emergency treatment if symptoms become worse.  Keep all follow-up visits as told by your health care provider. This includes visits for any further testing if your chest pain does not go away.  This information is not intended to replace advice given to you by your health care provider. Make sure you discuss any questions you have with your health care provider.

## 2024-05-23 NOTE — ED PROVIDER NOTE - OBJECTIVE STATEMENT
64-year-old male history of hypertension hyperlipidemia on baby aspirin complaining about 2 days of midsternal chest pain nonradiating.  Denies any shortness of breath difficulty breathing.  Took a dose of baby aspirin prior to arrival.

## 2024-05-23 NOTE — ED PROVIDER NOTE - CLINICAL SUMMARY MEDICAL DECISION MAKING FREE TEXT BOX
64-year-old male history of hypertension hyperlipidemia on baby aspirin complaining about 2 days of midsternal chest pain nonradiating.  Denies any shortness of breath difficulty breathing.  Took a dose of baby aspirin prior to arrival.    r/o acs, costochondritis, gastritis, labs, ekg, XR, IV analgesia, reassess

## 2024-05-23 NOTE — ED PROVIDER NOTE - PATIENT PORTAL LINK FT
You can access the FollowMyHealth Patient Portal offered by [facility] by registering at the following website: [email or web address]. By joining [employer]’s FollowMyHealth portal, you will also be able to view your health information using other applications (apps) compatible with our system.

## 2024-05-23 NOTE — ED PROVIDER NOTE - WR ORDER DATE AND TIME 1
6/20/2017      Shiloh Carr  [address] [facility] [address]      Dear Shiloh      I am writing to inform you of the results of recent testing that you had done.     In this letter the word normal means that the test result was acceptable and does not indicate any problem or illness.     Test: Macro Urin  Date: 06/14/2017  Results: Negative/Normal    If you have any further questions please call me at [phone]             Dr.Bruce Grant  [address] [address] [place] [id number]         22-May-2024 23:15

## 2024-05-23 NOTE — ED ADULT NURSE NOTE - OBJECTIVE STATEMENT
Pt arrived to ED c/o bilat shoulder pain radiating to chest started a couple of days ago. Denies any other S&S, denies SOB/N/V/D/f/c. Pt reports increased pain w/ movement. EKG done, labs sent, toradol given, pt placed on cardiac monitor at bedside. Will continue to monitor.
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
## 2024-05-23 NOTE — ED PROVIDER NOTE - PHYSICAL EXAMINATION
Gen: Alert, NAD  Head/eyes: NC/AT, PERRL  ENT: airway patent  Neck: supple  Pulm/lung: Bilateral clear BS  CV/heart: RRR  GI/Abd: soft, NT/ND, +BS, no guarding/rebound tenderness  Musculoskeletal: no edema/erythema/cyanosis, +reproducible ttp anterior sternum,   Skin: no rash  Neuro: AAOx3, grossly intact

## 2024-05-23 NOTE — ED ADULT NURSE NOTE - ED STAT RN HANDOFF DETAILS
D/c instructions reviewed, verbalized understanding. VS stable, IV removed- intact. Pt ambulatory, left ED in stable condition.

## 2024-07-12 NOTE — ED ADULT NURSE NOTE - SUICIDE SCREENING QUESTION 2
----- Message from Julio Covington sent at 7/12/2024  2:18 PM CDT -----  Regarding: refill  Type:  RX Refill Request    Who Called: pt    Refill or New Rx:refill    RX Name and Strength:fentaNYL (DURAGESIC) 25 mcg/hr 10 patch 0 6/14/2024 7/14/2024   Sig - Route: Place 1 patch onto the skin every 72 hours. - Transdermal   Sent to pharmacy as: fentaNYL (DURAGESIC) 25 mcg/hr   Earliest Fill Date: 6/14/2024   Notes to Pharmacy: Medically necessary for more than 7 days, ICD 10: G89.4   E-Prescribing Status: Receipt confirmed by pharmacy (5/23/2024 12:10 PM CDT)         How is the patient currently taking it? (ex. 1XDay):see above    Is this a 30 day or 90 day RX:see above    Preferred Pharmacy with phone number:    [facility] - [place] - [id number] [address] [phone] [id number] [address]  [facility] [address]  Phone: [phone] Fax: [phone]    Local or Mail Order:local    Ordering Provider:gary         Best Call Back Number:[phone]      Additional Information:pt is down to his last patch.  Please call to discuss.  
He should have 2 prescriptions on file already that were sent in May.  
LOV: 05.23.24    Last script:06.14.24  
No

## 2024-10-30 ENCOUNTER — EMERGENCY (EMERGENCY)
Facility: HOSPITAL | Age: 64
LOS: 1 days | Discharge: HOME | End: 2024-10-30
Attending: EMERGENCY MEDICINE | Admitting: EMERGENCY MEDICINE
Payer: COMMERCIAL

## 2024-10-30 VITALS
DIASTOLIC BLOOD PRESSURE: 62 MMHG | OXYGEN SATURATION: 100 % | TEMPERATURE: 98 F | HEIGHT: 74 IN | WEIGHT: 231.93 LBS | HEART RATE: 74 BPM | RESPIRATION RATE: 18 BRPM | SYSTOLIC BLOOD PRESSURE: 94 MMHG

## 2024-10-30 VITALS — OXYGEN SATURATION: 100 % | RESPIRATION RATE: 16 BRPM | TEMPERATURE: 98 F

## 2024-10-30 LAB
ALBUMIN SERPL ELPH-MCNC: 2.9 G/DL — LOW (ref 3.3–5)
ALP SERPL-CCNC: 115 U/L — SIGNIFICANT CHANGE UP (ref 40–120)
ALT FLD-CCNC: 41 U/L — SIGNIFICANT CHANGE UP (ref 12–78)
ANION GAP SERPL CALC-SCNC: 7 MMOL/L — SIGNIFICANT CHANGE UP (ref 5–17)
APTT BLD: 28.3 SEC — SIGNIFICANT CHANGE UP (ref 24.5–35.6)
AST SERPL-CCNC: 31 U/L — SIGNIFICANT CHANGE UP (ref 15–37)
BASOPHILS # BLD AUTO: 0.03 K/UL — SIGNIFICANT CHANGE UP (ref 0–0.2)
BASOPHILS NFR BLD AUTO: 0.4 % — SIGNIFICANT CHANGE UP (ref 0–2)
BILIRUB SERPL-MCNC: 0.7 MG/DL — SIGNIFICANT CHANGE UP (ref 0.2–1.2)
BUN SERPL-MCNC: 22 MG/DL — SIGNIFICANT CHANGE UP (ref 7–23)
CALCIUM SERPL-MCNC: 8.2 MG/DL — LOW (ref 8.5–10.1)
CHLORIDE SERPL-SCNC: 109 MMOL/L — HIGH (ref 96–108)
CO2 SERPL-SCNC: 25 MMOL/L — SIGNIFICANT CHANGE UP (ref 22–31)
CREAT SERPL-MCNC: 1.1 MG/DL — SIGNIFICANT CHANGE UP (ref 0.5–1.3)
EGFR: 76 ML/MIN/1.73M2 — SIGNIFICANT CHANGE UP
EGFR: 76 ML/MIN/1.73M2 — SIGNIFICANT CHANGE UP
EOSINOPHIL # BLD AUTO: 0.15 K/UL — SIGNIFICANT CHANGE UP (ref 0–0.5)
EOSINOPHIL NFR BLD AUTO: 1.8 % — SIGNIFICANT CHANGE UP (ref 0–6)
GLUCOSE SERPL-MCNC: 108 MG/DL — HIGH (ref 70–99)
HCT VFR BLD CALC: 30.4 % — LOW (ref 39–50)
HGB BLD-MCNC: 10.1 G/DL — LOW (ref 13–17)
IMM GRANULOCYTES NFR BLD AUTO: 1.3 % — HIGH (ref 0–0.9)
INR BLD: 0.98 RATIO — SIGNIFICANT CHANGE UP (ref 0.85–1.16)
LYMPHOCYTES # BLD AUTO: 1.45 K/UL — SIGNIFICANT CHANGE UP (ref 1–3.3)
LYMPHOCYTES # BLD AUTO: 17.1 % — SIGNIFICANT CHANGE UP (ref 13–44)
MCHC RBC-ENTMCNC: 29.9 PG — SIGNIFICANT CHANGE UP (ref 27–34)
MCHC RBC-ENTMCNC: 33.2 G/DL — SIGNIFICANT CHANGE UP (ref 32–36)
MCV RBC AUTO: 89.9 FL — SIGNIFICANT CHANGE UP (ref 80–100)
MONOCYTES # BLD AUTO: 0.93 K/UL — HIGH (ref 0–0.9)
MONOCYTES NFR BLD AUTO: 11 % — SIGNIFICANT CHANGE UP (ref 2–14)
NEUTROPHILS # BLD AUTO: 5.81 K/UL — SIGNIFICANT CHANGE UP (ref 1.8–7.4)
NEUTROPHILS NFR BLD AUTO: 68.4 % — SIGNIFICANT CHANGE UP (ref 43–77)
NRBC # BLD: 0 /100 WBCS — SIGNIFICANT CHANGE UP (ref 0–0)
NRBC BLD-RTO: 0 /100 WBCS — SIGNIFICANT CHANGE UP (ref 0–0)
PLATELET # BLD AUTO: 505 K/UL — HIGH (ref 150–400)
POTASSIUM SERPL-MCNC: 3.6 MMOL/L — SIGNIFICANT CHANGE UP (ref 3.5–5.3)
POTASSIUM SERPL-SCNC: 3.6 MMOL/L — SIGNIFICANT CHANGE UP (ref 3.5–5.3)
PROT SERPL-MCNC: 6.7 G/DL — SIGNIFICANT CHANGE UP (ref 6–8.3)
PROTHROM AB SERPL-ACNC: 11.6 SEC — SIGNIFICANT CHANGE UP (ref 9.9–13.4)
RBC # BLD: 3.38 M/UL — LOW (ref 4.2–5.8)
RBC # FLD: 14.4 % — SIGNIFICANT CHANGE UP (ref 10.3–14.5)
SODIUM SERPL-SCNC: 141 MMOL/L — SIGNIFICANT CHANGE UP (ref 135–145)
TROPONIN I, HIGH SENSITIVITY RESULT: 11.5 NG/L — SIGNIFICANT CHANGE UP
WBC # BLD: 8.48 K/UL — SIGNIFICANT CHANGE UP (ref 3.8–10.5)
WBC # FLD AUTO: 8.48 K/UL — SIGNIFICANT CHANGE UP (ref 3.8–10.5)

## 2024-10-30 PROCEDURE — 36415 COLL VENOUS BLD VENIPUNCTURE: CPT

## 2024-10-30 PROCEDURE — 84484 ASSAY OF TROPONIN QUANT: CPT

## 2024-10-30 PROCEDURE — 99285 EMERGENCY DEPT VISIT HI MDM: CPT

## 2024-10-30 PROCEDURE — 85025 COMPLETE CBC W/AUTO DIFF WBC: CPT

## 2024-10-30 PROCEDURE — 85730 THROMBOPLASTIN TIME PARTIAL: CPT

## 2024-10-30 PROCEDURE — 93010 ELECTROCARDIOGRAM REPORT: CPT

## 2024-10-30 PROCEDURE — 85610 PROTHROMBIN TIME: CPT

## 2024-10-30 PROCEDURE — 71045 X-RAY EXAM CHEST 1 VIEW: CPT

## 2024-10-30 PROCEDURE — 80053 COMPREHEN METABOLIC PANEL: CPT

## 2024-10-30 PROCEDURE — 71045 X-RAY EXAM CHEST 1 VIEW: CPT | Mod: 26

## 2024-10-30 PROCEDURE — 93005 ELECTROCARDIOGRAM TRACING: CPT

## 2024-10-30 PROCEDURE — 99285 EMERGENCY DEPT VISIT HI MDM: CPT | Mod: 25

## 2024-10-30 RX ADMIN — Medication 1000 MILLILITER(S): at 13:50

## 2024-11-01 NOTE — ED PROVIDER NOTE - CARE PLAN
Patient/Caregiver requests family/friend to interpret. Principal Discharge DX:	Nonspecific chest pain   1

## 2024-12-22 ENCOUNTER — EMERGENCY (EMERGENCY)
Facility: HOSPITAL | Age: 64
LOS: 1 days | Discharge: LEFT BEFORE DISCHARGE | End: 2024-12-22
Attending: STUDENT IN AN ORGANIZED HEALTH CARE EDUCATION/TRAINING PROGRAM | Admitting: STUDENT IN AN ORGANIZED HEALTH CARE EDUCATION/TRAINING PROGRAM
Payer: COMMERCIAL

## 2024-12-22 VITALS
OXYGEN SATURATION: 97 % | TEMPERATURE: 98 F | RESPIRATION RATE: 20 BRPM | WEIGHT: 220.02 LBS | HEART RATE: 90 BPM | HEIGHT: 74 IN | DIASTOLIC BLOOD PRESSURE: 64 MMHG | SYSTOLIC BLOOD PRESSURE: 132 MMHG

## 2024-12-22 PROCEDURE — 99282 EMERGENCY DEPT VISIT SF MDM: CPT

## 2024-12-22 PROCEDURE — 99283 EMERGENCY DEPT VISIT LOW MDM: CPT

## 2024-12-22 RX ORDER — ACETAMINOPHEN 500MG 500 MG/1
650 TABLET, COATED ORAL ONCE
Refills: 0 | Status: COMPLETED | OUTPATIENT
Start: 2024-12-22 | End: 2024-12-22

## 2024-12-22 RX ADMIN — ACETAMINOPHEN 500MG 650 MILLIGRAM(S): 500 TABLET, COATED ORAL at 13:35

## 2024-12-22 NOTE — ED ADULT TRIAGE NOTE - CHIEF COMPLAINT QUOTE
64 yr old male arrives to ED c/o left knee pain, pt notes left knee replacement on 12/20/24. Surgical site dry and intact no drainage noted, pt admits pain above surgical site and to left calf, per pt " it feels tight and I'm having 10/1 pain. pt notes to be taking Asprin 81mg. pt denies fever chills, chest pain or sob at this time. Magaly LANGE

## 2024-12-22 NOTE — ED ADULT NURSE NOTE - NSFALLHARMRISKINTERV_ED_ALL_ED

## 2024-12-22 NOTE — ED PROVIDER NOTE - CLINICAL SUMMARY MEDICAL DECISION MAKING FREE TEXT BOX
Bala ATTG   64-year-old male status post knee replacement at [place] approximate 1 to 2 weeks ago chief complaint of left knee pain.  Patient states he is not taking blood thinners told his orthopedic doctor was told to come in secondary due to concern for possible clots.  Patient is having calf pain and left upper thigh pain.    GENERAL: Awake, alert, NAD  LUNGS: non labored breathing   ABDOMEN: Soft, , non tender, non distended, no rebound, no guarding  BACK: No midline spinal tenderness  EXT: No edema, no calf tenderness,  no deformities. mild swelling w/ warmth to left leg, pt showed me picture of site appears clean   NEURO: A&Ox3. Moving all extremities.  SKIN: Warm and dry. No rash.  PSYCH: Normal affect.     given hx and pe will obtain us for dvt ro appears well healed pt has close follow up with  ortho pain prn

## 2024-12-22 NOTE — ED PROVIDER NOTE - PATIENT PORTAL LINK FT
Spoke with pt who states she just has runny nose and sneezing. Pt denies SOB, weakness, cough, sore throat, fever, chest pain, weakness, diarrhea, nausea. Pt states she will go to [address] [place] [address] tomorrow and ER tonight if symptoms worsen.    [address] [place] [address] You can access the FollowMyHealth Patient Portal offered by [facility] by registering at the following website: [email or web address]. By joining [employer]’s FollowMyHealth portal, you will also be able to view your health information using other applications (apps) compatible with our system.

## 2024-12-22 NOTE — ED PROVIDER NOTE - NSFOLLOWUPINSTRUCTIONS_ED_ALL_ED_FT
WHAT YOU NEED TO KNOW:    Knee pain may start suddenly, or it may be a long-term problem. You may have pain on the side, front, or back of your knee. You may have knee stiffness and swelling. You may hear popping sounds or feel like your knee is giving way or locking up as you walk. You may feel pain when you sit, stand, walk, or climb up and down stairs. Knee pain can be caused by conditions such as obesity, inflammation, or strains or tears in ligaments or tendons.    DISCHARGE INSTRUCTIONS:    Return to the emergency department if:    Your pain is worse, even after treatment.    You cannot bend or straighten your leg completely.    The swelling around your knee does not go down even with treatment.    Your knee is painful and hot to the touch.  Contact your healthcare provider if:    You have questions or concerns about your condition or care.    Medicines: You may need any of the following:    NSAIDs help decrease swelling and pain or fever. This medicine is available with or without a doctor's order. NSAIDs can cause stomach bleeding or kidney problems in certain people. If you take blood thinner medicine, always ask your healthcare provider if NSAIDs are safe for you. Always read the medicine label and follow directions.    Acetaminophen decreases pain and fever. It is available without a doctor's order. Ask how much to take and how often to take it. Follow directions. Read the labels of all other medicines you are using to see if they also contain acetaminophen, or ask your doctor or pharmacist. Acetaminophen can cause liver damage if not taken correctly.    Prescription pain medicine may be given. Ask your healthcare provider how to take this medicine safely. Some prescription pain medicines contain acetaminophen. Do not take other medicines that contain acetaminophen without talking to your healthcare provider. Too much acetaminophen may cause liver damage. Prescription pain medicine may cause constipation. Ask your healthcare provider how to prevent or treat constipation.    Take your medicine as directed. Contact your healthcare provider if you think your medicine is not helping or if you have side effects. Tell your provider if you are allergic to any medicine. Keep a list of the medicines, vitamins, and herbs you take. Include the amounts, and when and why you take them. Bring the list or the pill bottles to follow-up visits. Carry your medicine list with you in case of an emergency.  What you can do to manage your symptoms:    Rest your knee so it can heal. Limit activities that increase your pain. Do low-impact exercises, such as walking or swimming.    Apply ice to help reduce swelling and pain. Use an ice pack, or put crushed ice in a plastic bag. Cover it with a towel before you apply it to your knee. Apply ice for 15 to 20 minutes every hour, or as directed.    Apply compression to help reduce swelling. Use a brace or bandage only as directed.    Elevate your knee to help decrease pain and swelling. Elevate your knee while you are sitting or lying down. Prop your leg on pillows to keep your knee above the level of your heart.    Prevent your knee from moving as directed. Your healthcare provider may put on a cast or splint. You may need to wear a leg brace to stabilize your knee. A leg brace can be adjusted to increase your range of motion as your knee heals.  Hinged Knee Braces   What you can do to prevent knee pain:    Maintain a healthy weight. Extra weight increases your risk for knee pain. Ask your healthcare provider how much you should weigh. He or she can help you create a safe weight loss plan if you need to lose weight.    Exercise or train properly. Use the correct equipment for sports. Wear shoes that provide good support. Check your posture often as you exercise, play sports, or train for an event. This can help prevent stress and strain on your knees. Rest between sessions so you do not overwork your knees.  Follow up with your healthcare provider within 24 hours or as directed: You may need follow-up treatments, such as steroid injections to decrease pain. Write down your questions so you remember to ask them during your visits. You are leaving against medical advise, please seek care ASAP.    Please follow up with your Primary Care Physician and any specialists as discussed- Orthopedics or Vascular.    If your symptoms persist or worsen, please seek care. Either return to the Emergency Department, go to urgent care or see your primary care doctor.  Please refer to general information and instructions attached or below:     WHAT YOU NEED TO KNOW:    Knee pain may start suddenly, or it may be a long-term problem. You may have pain on the side, front, or back of your knee. You may have knee stiffness and swelling. You may hear popping sounds or feel like your knee is giving way or locking up as you walk. You may feel pain when you sit, stand, walk, or climb up and down stairs. Knee pain can be caused by conditions such as obesity, inflammation, or strains or tears in ligaments or tendons.    DISCHARGE INSTRUCTIONS:    Return to the emergency department if:    Your pain is worse, even after treatment.    You cannot bend or straighten your leg completely.    The swelling around your knee does not go down even with treatment.    Your knee is painful and hot to the touch.  Contact your healthcare provider if:    You have questions or concerns about your condition or care.    Medicines: You may need any of the following:    NSAIDs help decrease swelling and pain or fever. This medicine is available with or without a doctor's order. NSAIDs can cause stomach bleeding or kidney problems in certain people. If you take blood thinner medicine, always ask your healthcare provider if NSAIDs are safe for you. Always read the medicine label and follow directions.    Acetaminophen decreases pain and fever. It is available without a doctor's order. Ask how much to take and how often to take it. Follow directions. Read the labels of all other medicines you are using to see if they also contain acetaminophen, or ask your doctor or pharmacist. Acetaminophen can cause liver damage if not taken correctly.    Prescription pain medicine may be given. Ask your healthcare provider how to take this medicine safely. Some prescription pain medicines contain acetaminophen. Do not take other medicines that contain acetaminophen without talking to your healthcare provider. Too much acetaminophen may cause liver damage. Prescription pain medicine may cause constipation. Ask your healthcare provider how to prevent or treat constipation.    Take your medicine as directed. Contact your healthcare provider if you think your medicine is not helping or if you have side effects. Tell your provider if you are allergic to any medicine. Keep a list of the medicines, vitamins, and herbs you take. Include the amounts, and when and why you take them. Bring the list or the pill bottles to follow-up visits. Carry your medicine list with you in case of an emergency.  What you can do to manage your symptoms:    Rest your knee so it can heal. Limit activities that increase your pain. Do low-impact exercises, such as walking or swimming.    Apply ice to help reduce swelling and pain. Use an ice pack, or put crushed ice in a plastic bag. Cover it with a towel before you apply it to your knee. Apply ice for 15 to 20 minutes every hour, or as directed.    Apply compression to help reduce swelling. Use a brace or bandage only as directed.    Elevate your knee to help decrease pain and swelling. Elevate your knee while you are sitting or lying down. Prop your leg on pillows to keep your knee above the level of your heart.    Prevent your knee from moving as directed. Your healthcare provider may put on a cast or splint. You may need to wear a leg brace to stabilize your knee. A leg brace can be adjusted to increase your range of motion as your knee heals.  Hinged Knee Braces   What you can do to prevent knee pain:    Maintain a healthy weight. Extra weight increases your risk for knee pain. Ask your healthcare provider how much you should weigh. He or she can help you create a safe weight loss plan if you need to lose weight.    Exercise or train properly. Use the correct equipment for sports. Wear shoes that provide good support. Check your posture often as you exercise, play sports, or train for an event. This can help prevent stress and strain on your knees. Rest between sessions so you do not overwork your knees.  Follow up with your healthcare provider within 24 hours or as directed: You may need follow-up treatments, such as steroid injections to decrease pain. Write down your questions so you remember to ask them during your visits.

## 2024-12-22 NOTE — ED PROVIDER NOTE - PROGRESS NOTE DETAILS
Signout pending sonogram to rule out DVT.  Informed by nurse that patient does not always want to wait for imaging.  Ultrasound tech called, can get ultrasound now.  This was discussed with patient who repeats he does not want to wait wants to leave and will get a sonogram done as an outpatient.  Patient to sign out AGAINST MEDICAL ADVICE after discussion of risks benefits alternatives.

## 2024-12-22 NOTE — ED ADULT NURSE NOTE - OBJECTIVE STATEMENT
Pt came in from home c/o pain and swelling in left knee. Pt states he is s/p knee replacement 12/20. Pt states he called his surgeon and was contacted to come to the ED to r/o blood clots. Left knee noted to be warm and swollen, Dressing intact - no s/s of bleeding or drainage. Pt denies chest pain, SOB, n/v/d fever or chills. Pt remains resting in stretcher.

## 2025-02-11 ENCOUNTER — APPOINTMENT (OUTPATIENT)
Dept: ORTHOPEDIC SURGERY | Facility: CLINIC | Age: 65
End: 2025-02-11
Payer: COMMERCIAL

## 2025-02-11 VITALS — BODY MASS INDEX: 26.95 KG/M2 | HEIGHT: 74 IN | WEIGHT: 210 LBS

## 2025-02-11 DIAGNOSIS — T84.84XA PAIN DUE TO INTERNAL ORTHOPEDIC PROSTHETIC DEVICES, IMPLANTS AND GRAFTS, INITIAL ENCOUNTER: ICD-10-CM

## 2025-02-11 DIAGNOSIS — Z96.652 PAIN DUE TO INTERNAL ORTHOPEDIC PROSTHETIC DEVICES, IMPLANTS AND GRAFTS, INITIAL ENCOUNTER: ICD-10-CM

## 2025-02-11 PROCEDURE — 99203 OFFICE O/P NEW LOW 30 MIN: CPT

## 2025-02-11 PROCEDURE — 73562 X-RAY EXAM OF KNEE 3: CPT | Mod: LT

## 2025-05-28 ENCOUNTER — INPATIENT (INPATIENT)
Facility: HOSPITAL | Age: 65
LOS: 0 days | Discharge: HOME | DRG: 556 | End: 2025-05-29
Attending: STUDENT IN AN ORGANIZED HEALTH CARE EDUCATION/TRAINING PROGRAM | Admitting: INTERNAL MEDICINE
Payer: COMMERCIAL

## 2025-05-28 VITALS
OXYGEN SATURATION: 96 % | HEART RATE: 82 BPM | SYSTOLIC BLOOD PRESSURE: 133 MMHG | WEIGHT: 231.93 LBS | RESPIRATION RATE: 20 BRPM | HEIGHT: 74 IN | DIASTOLIC BLOOD PRESSURE: 89 MMHG | TEMPERATURE: 98 F

## 2025-05-28 LAB
ADD ON TEST-SPECIMEN IN LAB: SIGNIFICANT CHANGE UP
ALBUMIN SERPL ELPH-MCNC: 3.8 G/DL — SIGNIFICANT CHANGE UP (ref 3.3–5)
ALP SERPL-CCNC: 88 U/L — SIGNIFICANT CHANGE UP (ref 40–120)
ALT FLD-CCNC: 20 U/L — SIGNIFICANT CHANGE UP (ref 10–45)
ANION GAP SERPL CALC-SCNC: 12 MMOL/L — SIGNIFICANT CHANGE UP (ref 5–17)
ANION GAP SERPL CALC-SCNC: 13 MMOL/L — SIGNIFICANT CHANGE UP (ref 5–17)
AST SERPL-CCNC: 45 U/L — HIGH (ref 10–40)
BASOPHILS # BLD AUTO: 0.05 K/UL — SIGNIFICANT CHANGE UP (ref 0–0.2)
BASOPHILS NFR BLD AUTO: 0.5 % — SIGNIFICANT CHANGE UP (ref 0–2)
BILIRUB SERPL-MCNC: 0.7 MG/DL — SIGNIFICANT CHANGE UP (ref 0.2–1.2)
BUN SERPL-MCNC: 14 MG/DL — SIGNIFICANT CHANGE UP (ref 7–23)
BUN SERPL-MCNC: 15 MG/DL — SIGNIFICANT CHANGE UP (ref 7–23)
CALCIUM SERPL-MCNC: 8.9 MG/DL — SIGNIFICANT CHANGE UP (ref 8.4–10.5)
CALCIUM SERPL-MCNC: 9.2 MG/DL — SIGNIFICANT CHANGE UP (ref 8.4–10.5)
CHLORIDE SERPL-SCNC: 104 MMOL/L — SIGNIFICANT CHANGE UP (ref 96–108)
CHLORIDE SERPL-SCNC: 104 MMOL/L — SIGNIFICANT CHANGE UP (ref 96–108)
CO2 SERPL-SCNC: 18 MMOL/L — LOW (ref 22–31)
CO2 SERPL-SCNC: 23 MMOL/L — SIGNIFICANT CHANGE UP (ref 22–31)
CREAT SERPL-MCNC: 1.1 MG/DL — SIGNIFICANT CHANGE UP (ref 0.5–1.3)
CREAT SERPL-MCNC: 1.26 MG/DL — SIGNIFICANT CHANGE UP (ref 0.5–1.3)
CRP SERPL-MCNC: 12 MG/L — HIGH (ref 0–4)
EGFR: 64 ML/MIN/1.73M2 — SIGNIFICANT CHANGE UP
EGFR: 64 ML/MIN/1.73M2 — SIGNIFICANT CHANGE UP
EGFR: 75 ML/MIN/1.73M2 — SIGNIFICANT CHANGE UP
EGFR: 75 ML/MIN/1.73M2 — SIGNIFICANT CHANGE UP
EOSINOPHIL # BLD AUTO: 0.08 K/UL — SIGNIFICANT CHANGE UP (ref 0–0.5)
EOSINOPHIL NFR BLD AUTO: 0.8 % — SIGNIFICANT CHANGE UP (ref 0–6)
FLUAV AG NPH QL: SIGNIFICANT CHANGE UP
FLUBV AG NPH QL: SIGNIFICANT CHANGE UP
GAS PNL BLDV: SIGNIFICANT CHANGE UP
GLUCOSE SERPL-MCNC: 107 MG/DL — HIGH (ref 70–99)
GLUCOSE SERPL-MCNC: 108 MG/DL — HIGH (ref 70–99)
HCT VFR BLD CALC: 45.8 % — SIGNIFICANT CHANGE UP (ref 39–50)
HGB BLD-MCNC: 15.2 G/DL — SIGNIFICANT CHANGE UP (ref 13–17)
IMM GRANULOCYTES NFR BLD AUTO: 0.3 % — SIGNIFICANT CHANGE UP (ref 0–0.9)
LYMPHOCYTES # BLD AUTO: 3 K/UL — SIGNIFICANT CHANGE UP (ref 1–3.3)
LYMPHOCYTES # BLD AUTO: 30.2 % — SIGNIFICANT CHANGE UP (ref 13–44)
MCHC RBC-ENTMCNC: 29.5 PG — SIGNIFICANT CHANGE UP (ref 27–34)
MCHC RBC-ENTMCNC: 33.2 G/DL — SIGNIFICANT CHANGE UP (ref 32–36)
MCV RBC AUTO: 88.8 FL — SIGNIFICANT CHANGE UP (ref 80–100)
MONOCYTES # BLD AUTO: 1.05 K/UL — HIGH (ref 0–0.9)
MONOCYTES NFR BLD AUTO: 10.6 % — SIGNIFICANT CHANGE UP (ref 2–14)
NEUTROPHILS # BLD AUTO: 5.71 K/UL — SIGNIFICANT CHANGE UP (ref 1.8–7.4)
NEUTROPHILS NFR BLD AUTO: 57.6 % — SIGNIFICANT CHANGE UP (ref 43–77)
NRBC BLD AUTO-RTO: 0 /100 WBCS — SIGNIFICANT CHANGE UP (ref 0–0)
PLATELET # BLD AUTO: 254 K/UL — SIGNIFICANT CHANGE UP (ref 150–400)
POTASSIUM SERPL-MCNC: 3.7 MMOL/L — SIGNIFICANT CHANGE UP (ref 3.5–5.3)
POTASSIUM SERPL-MCNC: 7.2 MMOL/L — CRITICAL HIGH (ref 3.5–5.3)
POTASSIUM SERPL-SCNC: 3.7 MMOL/L — SIGNIFICANT CHANGE UP (ref 3.5–5.3)
POTASSIUM SERPL-SCNC: 7.2 MMOL/L — CRITICAL HIGH (ref 3.5–5.3)
PROT SERPL-MCNC: 7 G/DL — SIGNIFICANT CHANGE UP (ref 6–8.3)
RBC # BLD: 5.16 M/UL — SIGNIFICANT CHANGE UP (ref 4.2–5.8)
RBC # FLD: 13.9 % — SIGNIFICANT CHANGE UP (ref 10.3–14.5)
RSV RNA NPH QL NAA+NON-PROBE: SIGNIFICANT CHANGE UP
SARS-COV-2 RNA SPEC QL NAA+PROBE: SIGNIFICANT CHANGE UP
SODIUM SERPL-SCNC: 134 MMOL/L — LOW (ref 135–145)
SODIUM SERPL-SCNC: 140 MMOL/L — SIGNIFICANT CHANGE UP (ref 135–145)
SOURCE RESPIRATORY: SIGNIFICANT CHANGE UP
WBC # BLD: 9.92 K/UL — SIGNIFICANT CHANGE UP (ref 3.8–10.5)
WBC # FLD AUTO: 9.92 K/UL — SIGNIFICANT CHANGE UP (ref 3.8–10.5)

## 2025-05-28 PROCEDURE — 73564 X-RAY EXAM KNEE 4 OR MORE: CPT | Mod: 26,LT

## 2025-05-28 PROCEDURE — 93010 ELECTROCARDIOGRAM REPORT: CPT

## 2025-05-28 PROCEDURE — 73552 X-RAY EXAM OF FEMUR 2/>: CPT | Mod: 26,LT

## 2025-05-28 PROCEDURE — 73590 X-RAY EXAM OF LOWER LEG: CPT | Mod: 26,LT

## 2025-05-28 PROCEDURE — 71045 X-RAY EXAM CHEST 1 VIEW: CPT | Mod: 26

## 2025-05-28 PROCEDURE — 99285 EMERGENCY DEPT VISIT HI MDM: CPT

## 2025-05-28 RX ORDER — ACETAMINOPHEN 500 MG/5ML
975 LIQUID (ML) ORAL ONCE
Refills: 0 | Status: COMPLETED | OUTPATIENT
Start: 2025-05-28 | End: 2025-05-28

## 2025-05-28 RX ADMIN — Medication 975 MILLIGRAM(S): at 21:18

## 2025-05-28 NOTE — ED ADULT NURSE NOTE - OBJECTIVE STATEMENT
The is a 65 y m with a PMH of HTN, HLD and GERD. pt is Aox4 breathing evenly on room air and able to speak in full sentences. Pt came in via wheel chair and co L knee pain and swelling and decreased ability to walk. pt co fevers at [facility] and took tylenoal. pt is unable to bare weight on his leg right away, says he needs time to get himself. Pt denies chest pain sob head ache/

## 2025-05-28 NOTE — ED ADULT NURSE NOTE - NS ED NURSE DISCH DISPOSITION
Message from Carmudihart:  Original authorizing provider: MD Jazmín Rae would like a refill of the following medications:  atorvastatin (LIPITOR) 40 MG tablet [Elia Tyler MD]    Preferred pharmacy: Other - [facility]    Comment:    
Refill request for Atorvastatin 40 mg tab  Last refill 12/8/16    Last visit was 2/10/17  Future appointment 4/27/17    Rx sent to pharmacy        
Admitted

## 2025-05-28 NOTE — ED PROVIDER NOTE - OBJECTIVE STATEMENT
66yo male in [facility]  presents to the ER for evaluation of left knee pain  .  PT awake alert oriented x3  states " I started to feel a little pain last night in my left knee and this morning at around 9 am when  I woke up the pain became severe and I was not able to move my knee well or walk without extreme pain.  I had a knee replacement several years ago.  I think I had a fever today because I felt hot and sweaty and took tylenol around 230 pm". Pt denies any recent injury or trauma. Pt with dec ROM to left knee with significant sts and warmth to knee.  Pedal pulses presant.

## 2025-05-28 NOTE — ED PROVIDER NOTE - PROGRESS NOTE DETAILS
Attending Mandile: Patient received as sign out, initially presented with knee pain with prior total knee replacement. Ortho following. Initially signed out with plan for CT and then ortho decision for tap. Ortho proceeded with tap, fluid sent to the lab. Dimer noted to be elevated to 1247. Possibly related to ongoing inflammatory/infectious process. Likely will start with DVT study and possible CTA. Attending Mandile: Patient received as sign out, initially presented with knee pain with prior total knee replacement. Ortho following. Initially signed out with plan for CT and then ortho decision for tap. Ortho proceeded with tap, fluid sent to the lab. Dimer noted to be elevated to 1247. Possibly related to ongoing inflammatory/infectious process. Likely will start with DVT study and possible CTA if positive. Patient reports the chest discomfort and shortness of breath was only while he was having xrays performed because it was very painful. He denies any active pain. HR 70s, HD stable. Discussed dimer result and implications and patient in agreement with started with duplex. Attending Dr. Yao: CT resulted with rim-enhancing collection concerning for abscess. Spoke with radiology and more likely residual collection despite there being a tap performed prior as no gas appreciated. Spoke with ortho regarding initiation of antibiotics and surgical intervention. Pending discussion shortly with attending during rounds. EKG reviewed, nonischemic. Pending final recommendations. Ortho requesting morning labs. Attending Dr. Yao: Spoke with ortho and initially would like to admit for surgical intervention but given elevated dimer, requesting that DVT scan be performed first. CTA again discussed but will defer as patient without recurrence of chest pain, shortness of breath, satting well, HR nml. Wiliam Welsh PGY3:  Received update from orthopedic team.  The Ortho attending was able to speak with the attending that performed the patient's previous knee surgery.  They had discussed that the only thing the inpatient Ortho team would offer is a washout of the knee however after discussing this case with patient's previous surgeon they feel this will likely not be helpful as it appears to be a chronic issue.  The patient's previous surgeon is aware that they are currently in the ED and they will see the patient in their clinic tomorrow.  The orthopedic team will come back to update patient.  They recommend holding antibiotics at this time until patient is seen by his orthopedist tomorrow.    Updated patient about the plan with which she is agreeable. Wiliam Welsh PGY3:  Patient back from ultrasound.  He asked me into the room and told me that he does not want to follow-up with his previous orthopedic doctor and so is not comfortable with the plan of discharge to follow-up in their clinic tomorrow. Paged ortho team to discuss change in plan. received s/o pt reports he doesn't want to follow up w/ Dr. Esparza or anyone in the 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 Hidalgo at [facility] doesn't want to operate on the case, pt to be admitted to medicine for antibiotic management, to have infectious disease consult Wiliam Welsh PGY3:  Paged Infectious Disease for consult regarding abx choice. Wiliam Welsh PGY3:  Pt accepted for admission to Dr. Moreno Patient seen by infectious disease they state okay to go home and they gave patient follow-up with Dr. Curry who specializes in these joint infections.  Patient feels comfortable going home at this time will return if he develops worsening symptoms that include fever inability to ambulate worsening redness of his knee.  Plan for outpatient follow-up for further management.

## 2025-05-28 NOTE — ED PROVIDER NOTE - CARE PROVIDER_API CALL
Angel Curry  Joint Reconstruction  [address], [address]  [place] [address]  Phone: [phone]  Fax: [phone]  Follow Up Time:     Darien Mohr  Infectious Disease  [address] [place] [address]  Phone: [phone]  Fax: [phone]  Follow Up Time:

## 2025-05-28 NOTE — ED PROVIDER NOTE - NSFOLLOWUPINSTRUCTIONS_ED_ALL_ED_FT
You were seen in the emergency department for possible knee infection.  You were seen by infectious disease who state you are stable to go home at this time.  If you develop fever, vomiting, worsening knee pain please return to the hospital.  Infectious disease did not recommend antibiotics but they did want you to follow-up with Dr. Curry and Dr. Mohr  You were seen in the emergency department today and we recommend that you return if you develop chest pain, abdominal pain, shortness of breath, lightheadedness, vomiting, leg swelling, fever, headache, slurred speech, weakness or blurry vision.     Please follow up with your primary care doctor over the next 2-3 days for further management of your symptoms and to review your bloodwork to ensure no additional tests, imaging or bloodwork, need to be performed.

## 2025-05-28 NOTE — CONSULT NOTE ADULT - SUBJECTIVE AND OBJECTIVE BOX
Ortho to see  Do not initiate abx unless pt acutely septic/HD unstable if possible  FU ESR, BCx, UCx, and rest of infectious workup  CT L Knee with/without IV contrast  Consider LUE US/Doppler if c/f DVT  Hold all AC if medically tolerable for possible procedure  NPO until further notice for possible procedure  Preop CBC/BMP/coags/T&S/EKG/CXR for possible procedure     65y Male presents with atraumatic L knee pain x 24 hours. Patient states that he felt pain since yesterday that worsened this AM when going to the bathroom. Hx of what sounds/appears to be L primary TKA w/ revision components on 2018 at [place]. Endorses pain and swelling at knee but denies erythema. Has not noticed any pus or drainage at the affected site. Able to ambulate in ED w/ pain (at baseline ambulates without assistance and works as ). Endorses subjective single episode of fever earlier today. Has not trialed abx treatment since symptom onset. No falls/trauma. Denies numbness/tingling in the affected extremity. Not on AC.    PAST MEDICAL & SURGICAL HISTORY:    Home Medications:    Allergies    No Known Allergies    Intolerances                              15.2   9.92  )-----------( 254      ( 28 May 2025 19:40 )             45.8     05-28    140  |  104  |  15  ----------------------------<  108[H]  3.7   |  23  |  1.26    Ca    9.2      28 May 2025 23:14    TPro  7.0  /  Alb  3.8  /  TBili  0.7  /  DBili  x   /  AST  45[H]  /  ALT  20  /  AlkPhos  88  05-28      Urinalysis Basic - ( 28 May 2025 23:14 )    Color: x / Appearance: x / SG: x / pH: x  Gluc: 108 mg/dL / Ketone: x  / Bili: x / Urobili: x   Blood: x / Protein: x / Nitrite: x   Leuk Esterase: x / RBC: x / WBC x   Sq Epi: x / Non Sq Epi: x / Bacteria: x          Vital Signs Last 24 Hrs  T(C): 37 (29 May 2025 00:43), Max: 37.1 (28 May 2025 21:29)  T(F): 98.6 (29 May 2025 00:43), Max: 98.8 (28 May 2025 21:29)  HR: 67 (29 May 2025 00:43) (67 - 82)  BP: 170/75 (29 May 2025 00:43) (133/89 - 170/75)  BP(mean): --  RR: 18 (29 May 2025 00:43) (18 - 20)  SpO2: 98% (29 May 2025 00:43) (96% - 99%)    Parameters below as of 29 May 2025 00:43  Patient On (Oxygen Delivery Method): room air        PHYSICAL EXAM    General: NAD, awake and alert, pleasant    LLE:  No open skin, prior wound healed, +diffuse anterior swelling of knee, no erythema, no drainage  +TTP at knee, otherwise NTTP throughout, no obvious fluctuance or effusion  Limited AROM to 5-100 knee flexion/extension 2/2 symtpoms, otherwise painless AROM throughout  No pain w/ micromotion of knee  Able to SLR, no pain with axial load or log roll  Compartments soft and compressible  Motor: Fires HF/KE/Gsc/TA/EHL/FHL  Sensory: SILT SPN/DPN/Saph/Arnaud/Tib  2+ DP and PT pulses    Secondary Survey:   RUE: No TTP over bony prominences, SILT, palpable pulses, full/painless range of motion, compartments soft  LUE: No TTP over bony prominences, SILT, palpable pulses, full/painless range of motion, compartments soft  RLE: No TTP over bony prominences, SILT, palpable pulses, full/painless range of motion, compartments soft  Spine: No bony tenderness, no palpable stepoffs  Head/Chest/Abdomen: No obvious visible trauma    WBC: 9.9k  ESR: pending  CRP: 12      IMAGING:  XR L knee/tibia/femur (personal read): S/p TKA w/ revision stemmed hinged knee implant. Possible lucencies at cement/bone interface throughout. No acute obvious fxs or dislocations noted.      Assessment/Plan:  65y Male with R knee pain/swelling    -There is some mild concern for L TKA PJI in setting of these findings - will obtain CT L knee w/wo contrast and f/u inflammatory markers  -Consider LLE US/Doppler if c/f DVT  -Possible aspiration pending further results  -Please hold off on IV abx if not hemodynamically unstable until possible aspiration obtained  -Hold all AC if medically tolerable for possible procedure  -NPO until further notice for possible procedure  -Preop CBC/BMP/coags/T&S/EKG/CXR for possible procedure  -WBAT LLE  -FU ESR/CRP, trend CRP q48h while inpatient  -FU BCx  -Pain control as needed  -Ice and elevate as tolerated  -DVT ppx: per primary  -Rest of management per primary  -No acute orthopaedic surgery indicated at this time  -D/w Dr. Hidalgo, will advise if plan changes

## 2025-05-28 NOTE — ED PROVIDER NOTE - PATIENT PORTAL LINK FT
You can access the FollowMyHealth Patient Portal offered by [facility] by registering at the following website: [email or web address]. By joining [employer]’s FollowMyHealth portal, you will also be able to view your health information using other applications (apps) compatible with our system.

## 2025-05-28 NOTE — ED PROVIDER NOTE - ATTENDING APP SHARED VISIT CONTRIBUTION OF CARE
I, Abhinav Ferguson MD, Emergency Medicine Attending Physician, personally saw and examined the patient and I personally made/approve the management plan and take responsibility for the patient management.    MDM: 65-year-old male with history of hypertension and hyperlipidemia presents with left knee pain.  Patient started to have mild left knee pain last night when walking up the stairs, acutely worsened this morning which she noticed after he woke up, associated with significant swelling.  Patient states he had associated fever and chills with sweatiness today.  Patient with history of left TKA in 2018 in [place] (does not recall the name of his orthopedist).      ROS: denies trauma, numbness, weakness, knee giving out or buckling or any other areas of pain.     On examination, patient with elevated blood pressure otherwise stable vitals, well-appearing, in no acute distress. Cardiac examination RRR, lungs CTAB.  Abdomen is soft and nontender.  MSK exam shows significant swelling to the left knee with limited range of motion 0-90 degrees, but extensor mechanism intact.  Palpation with tenderness and mild warmth, but there is no erythema or breaks on inspection of the skin.  Neurovascularly intact in all 4 extremities with 5/5 strength, normal sensation, equal pulses and brisk capillary refill, soft compartments.    Will obtain labs to evaluate for hematologic disorder, metabolic derangements, hepatic and renal function, and screen for infection. Will obtain XR imaging to evaluate for bony pathology. Patient declines pain medication at this time.     Patient afebrile rectally. Labs with no leukocytosis, cough potassium hemolyzed, will obtain repeat, mildly elevated CRP of 12, no lactate elevation.  X-ray with findings that may reflect loosening. I, Abhinav Ferguson MD, Emergency Medicine Attending Physician, personally saw and examined the patient and I personally made/approve the management plan and take responsibility for the patient management.    MDM: 65-year-old male with history of hypertension and hyperlipidemia presents with left knee pain.  Patient started to have mild left knee pain last night when walking up the stairs, acutely worsened this morning which she noticed after he woke up, associated with significant swelling.  Patient states he had associated fever and chills with sweatiness today.  Patient with history of left TKA in 2018 in [place] (does not recall the name of his orthopedist).      ROS: denies trauma, numbness, weakness, knee giving out or buckling or any other areas of pain.     On examination, patient with elevated blood pressure otherwise stable vitals, well-appearing, in no acute distress. Cardiac examination RRR, lungs CTAB.  Abdomen is soft and nontender.  MSK exam shows significant swelling to the left knee with limited range of motion 0-90 degrees, but extensor mechanism intact.  Palpation with tenderness and mild warmth, but there is no erythema or breaks on inspection of the skin.  Neurovascularly intact in all 4 extremities with 5/5 strength, normal sensation, equal pulses and brisk capillary refill, soft compartments.    Will obtain labs to evaluate for hematologic disorder, metabolic derangements, hepatic and renal function, and screen for infection. Will obtain XR imaging to evaluate for bony pathology. Patient declines pain medication at this time.     Patient afebrile rectally. Labs with no leukocytosis, cough potassium hemolyzed, will obtain repeat, mildly elevated CRP of 12, no lactate elevation.  X-ray with findings that may reflect loosening.    Around 11:45 PM, after patient returned from x-ray, patient started to have left-sided chest pain/discomfort that is sharp in nature, not pleuritic or exertional.  No associated shortness of breath, nausea, diaphoresis, lightheadedness, syncope, palpitations.  Repeat exam benign cardiac RRR, lungs CTAB, neurovasc intact in all 4 extremities.  EKG nonischemic, will place patient on cardiac monitor and obtain cardiac workup including D-dimer given lower extremity symptoms and since patient works as a .    My independent interpretation of the EKG shows:  Sinus rhythm with occasional PVCs with rate of 70 bpm, no ST elevations or depressions.  QTc 444. I, Abhinav Ferguson MD, Emergency Medicine Attending Physician, personally saw and examined the patient and I personally made/approve the management plan and take responsibility for the patient management.    MDM: 65-year-old male with history of hypertension and hyperlipidemia presents with left knee pain.  Patient started to have mild left knee pain last night when walking up the stairs, acutely worsened this morning which she noticed after he woke up, associated with significant swelling.  Patient states he had associated fever and chills with sweatiness today.  Patient with history of left TKA in 2018 in [place] (does not recall the name of his orthopedist).      ROS: denies trauma, numbness, weakness, knee giving out or buckling or any other areas of pain.     On examination, patient with elevated blood pressure otherwise stable vitals, well-appearing, in no acute distress. Cardiac examination RRR, lungs CTAB.  Abdomen is soft and nontender.  MSK exam shows significant swelling to the left knee with limited range of motion 0-90 degrees, but extensor mechanism intact.  Palpation with tenderness and mild warmth, but there is no erythema or breaks on inspection of the skin.  Neurovascularly intact in all 4 extremities with 5/5 strength, normal sensation, equal pulses and brisk capillary refill, soft compartments.    Will obtain labs to evaluate for hematologic disorder, metabolic derangements, hepatic and renal function, and screen for infection. Will obtain XR imaging to evaluate for bony pathology. Patient declines pain medication at this time.     Patient afebrile rectally. Labs with no leukocytosis, cough potassium hemolyzed, will obtain repeat, mildly elevated CRP of 12, no lactate elevation.  X-ray with findings that may reflect loosening.    Around 11:45 PM, after patient returned from x-ray, patient started to have left-sided chest pain/discomfort that is sharp in nature, not pleuritic or exertional.  No associated shortness of breath, nausea, diaphoresis, lightheadedness, syncope, palpitations.  Repeat exam benign cardiac RRR, lungs CTAB, neurovasc intact in all 4 extremities.  EKG nonischemic, will place patient on cardiac monitor and obtain cardiac workup including D-dimer given lower extremity symptoms and since patient works as a .    My independent interpretation of the EKG shows:  Sinus rhythm with occasional PVCs with rate of 70 bpm, no ST elevations or depressions.  QTc 444.     Ortho rec'd CT and to hold off on atbx until they decide about arthrocentesis. Signed out at midnight pending full labs/imaging, consultant recs, and close reassessments for further treatment and likely admission.

## 2025-05-29 ENCOUNTER — TRANSCRIPTION ENCOUNTER (OUTPATIENT)
Age: 65
End: 2025-05-29

## 2025-05-29 VITALS
OXYGEN SATURATION: 96 % | TEMPERATURE: 98 F | RESPIRATION RATE: 18 BRPM | SYSTOLIC BLOOD PRESSURE: 135 MMHG | HEART RATE: 74 BPM | DIASTOLIC BLOOD PRESSURE: 82 MMHG

## 2025-05-29 DIAGNOSIS — M25.562 PAIN IN LEFT KNEE: ICD-10-CM

## 2025-05-29 LAB
ALBUMIN SERPL ELPH-MCNC: 3.7 G/DL — SIGNIFICANT CHANGE UP (ref 3.3–5)
ALP SERPL-CCNC: 102 U/L — SIGNIFICANT CHANGE UP (ref 40–120)
ALT FLD-CCNC: 15 U/L — SIGNIFICANT CHANGE UP (ref 10–45)
ANION GAP SERPL CALC-SCNC: 12 MMOL/L — SIGNIFICANT CHANGE UP (ref 5–17)
APTT BLD: 27.8 SEC — SIGNIFICANT CHANGE UP (ref 26.1–36.8)
AST SERPL-CCNC: 13 U/L — SIGNIFICANT CHANGE UP (ref 10–40)
B PERT IGG+IGM PNL SER: ABNORMAL
BASOPHILS # BLD AUTO: 0.04 K/UL — SIGNIFICANT CHANGE UP (ref 0–0.2)
BASOPHILS NFR BLD AUTO: 0.6 % — SIGNIFICANT CHANGE UP (ref 0–2)
BILIRUB SERPL-MCNC: 0.5 MG/DL — SIGNIFICANT CHANGE UP (ref 0.2–1.2)
BUN SERPL-MCNC: 19 MG/DL — SIGNIFICANT CHANGE UP (ref 7–23)
CALCIUM SERPL-MCNC: 8.6 MG/DL — SIGNIFICANT CHANGE UP (ref 8.4–10.5)
CHLORIDE SERPL-SCNC: 104 MMOL/L — SIGNIFICANT CHANGE UP (ref 96–108)
CO2 SERPL-SCNC: 22 MMOL/L — SIGNIFICANT CHANGE UP (ref 22–31)
COLOR FLD: YELLOW
CREAT SERPL-MCNC: 1.33 MG/DL — HIGH (ref 0.5–1.3)
D DIMER BLD IA.RAPID-MCNC: 1267 NG/ML DDU — HIGH
EGFR: 60 ML/MIN/1.73M2 — SIGNIFICANT CHANGE UP
EGFR: 60 ML/MIN/1.73M2 — SIGNIFICANT CHANGE UP
EOSINOPHIL # BLD AUTO: 0.13 K/UL — SIGNIFICANT CHANGE UP (ref 0–0.5)
EOSINOPHIL NFR BLD AUTO: 1.9 % — SIGNIFICANT CHANGE UP (ref 0–6)
ERYTHROCYTE [SEDIMENTATION RATE] IN BLOOD: 10 MM/HR — SIGNIFICANT CHANGE UP (ref 0–15)
FLUID INTAKE SUBSTANCE CLASS: SIGNIFICANT CHANGE UP
GLUCOSE SERPL-MCNC: 116 MG/DL — HIGH (ref 70–99)
GRAM STN FLD: SIGNIFICANT CHANGE UP
HCT VFR BLD CALC: 41.9 % — SIGNIFICANT CHANGE UP (ref 39–50)
HGB BLD-MCNC: 14 G/DL — SIGNIFICANT CHANGE UP (ref 13–17)
IMM GRANULOCYTES NFR BLD AUTO: 0.1 % — SIGNIFICANT CHANGE UP (ref 0–0.9)
INR BLD: 0.94 RATIO — SIGNIFICANT CHANGE UP (ref 0.85–1.16)
JOINT PATHOGENS PNL SNV NAA+NON-PROBE: SIGNIFICANT CHANGE UP
JOINT PCR SOURCE: SIGNIFICANT CHANGE UP
LYMPHOCYTES # BLD AUTO: 3.01 K/UL — SIGNIFICANT CHANGE UP (ref 1–3.3)
LYMPHOCYTES # BLD AUTO: 43.6 % — SIGNIFICANT CHANGE UP (ref 13–44)
LYMPHOCYTES # FLD: 4 % — SIGNIFICANT CHANGE UP
MCHC RBC-ENTMCNC: 29.4 PG — SIGNIFICANT CHANGE UP (ref 27–34)
MCHC RBC-ENTMCNC: 33.4 G/DL — SIGNIFICANT CHANGE UP (ref 32–36)
MCV RBC AUTO: 87.8 FL — SIGNIFICANT CHANGE UP (ref 80–100)
MONOCYTES # BLD AUTO: 0.83 K/UL — SIGNIFICANT CHANGE UP (ref 0–0.9)
MONOCYTES NFR BLD AUTO: 12 % — SIGNIFICANT CHANGE UP (ref 2–14)
MONOS+MACROS # FLD: 9 % — SIGNIFICANT CHANGE UP
NEUTROPHILS # BLD AUTO: 2.88 K/UL — SIGNIFICANT CHANGE UP (ref 1.8–7.4)
NEUTROPHILS NFR BLD AUTO: 41.8 % — LOW (ref 43–77)
NEUTROPHILS-BODY FLUID: 87 % — SIGNIFICANT CHANGE UP
NRBC # FLD: 0 % — SIGNIFICANT CHANGE UP
NRBC BLD AUTO-RTO: 0 /100 WBCS — SIGNIFICANT CHANGE UP (ref 0–0)
PLATELET # BLD AUTO: 258 K/UL — SIGNIFICANT CHANGE UP (ref 150–400)
POTASSIUM SERPL-MCNC: 3.9 MMOL/L — SIGNIFICANT CHANGE UP (ref 3.5–5.3)
POTASSIUM SERPL-SCNC: 3.9 MMOL/L — SIGNIFICANT CHANGE UP (ref 3.5–5.3)
PROT SERPL-MCNC: 6.3 G/DL — SIGNIFICANT CHANGE UP (ref 6–8.3)
PROTHROM AB SERPL-ACNC: 10.7 SEC — SIGNIFICANT CHANGE UP (ref 9.9–13.4)
RBC # BLD: 4.77 M/UL — SIGNIFICANT CHANGE UP (ref 4.2–5.8)
RBC # FLD: 13.7 % — SIGNIFICANT CHANGE UP (ref 10.3–14.5)
RCV VOL RI: 1500 CELLS/UL — SIGNIFICANT CHANGE UP
SODIUM SERPL-SCNC: 138 MMOL/L — SIGNIFICANT CHANGE UP (ref 135–145)
SPECIMEN SOURCE: SIGNIFICANT CHANGE UP
SYNOVIAL CRYSTALS CLARITY: ABNORMAL
SYNOVIAL CRYSTALS COLOR: YELLOW
SYNOVIAL CRYSTALS ID: SIGNIFICANT CHANGE UP
SYNOVIAL CRYSTALS TUBE: SIGNIFICANT CHANGE UP
TOTAL NUCLEATED CELL COUNT, BODY FLUID: 8050 CELLS/UL — SIGNIFICANT CHANGE UP
TROPONIN T, HIGH SENSITIVITY RESULT: 8 NG/L — SIGNIFICANT CHANGE UP (ref 0–51)
TUBE TYPE: SIGNIFICANT CHANGE UP
WBC # BLD: 6.9 K/UL — SIGNIFICANT CHANGE UP (ref 3.8–10.5)
WBC # FLD AUTO: 6.9 K/UL — SIGNIFICANT CHANGE UP (ref 3.8–10.5)

## 2025-05-29 PROCEDURE — 85730 THROMBOPLASTIN TIME PARTIAL: CPT

## 2025-05-29 PROCEDURE — 93970 EXTREMITY STUDY: CPT | Mod: 26

## 2025-05-29 PROCEDURE — 84295 ASSAY OF SERUM SODIUM: CPT

## 2025-05-29 PROCEDURE — 87070 CULTURE OTHR SPECIMN AEROBIC: CPT

## 2025-05-29 PROCEDURE — 84484 ASSAY OF TROPONIN QUANT: CPT

## 2025-05-29 PROCEDURE — 85610 PROTHROMBIN TIME: CPT

## 2025-05-29 PROCEDURE — 73590 X-RAY EXAM OF LOWER LEG: CPT

## 2025-05-29 PROCEDURE — 87075 CULTR BACTERIA EXCEPT BLOOD: CPT

## 2025-05-29 PROCEDURE — 85652 RBC SED RATE AUTOMATED: CPT

## 2025-05-29 PROCEDURE — 82435 ASSAY OF BLOOD CHLORIDE: CPT

## 2025-05-29 PROCEDURE — 80048 BASIC METABOLIC PNL TOTAL CA: CPT

## 2025-05-29 PROCEDURE — 82803 BLOOD GASES ANY COMBINATION: CPT

## 2025-05-29 PROCEDURE — 36415 COLL VENOUS BLD VENIPUNCTURE: CPT

## 2025-05-29 PROCEDURE — 85018 HEMOGLOBIN: CPT

## 2025-05-29 PROCEDURE — 93005 ELECTROCARDIOGRAM TRACING: CPT

## 2025-05-29 PROCEDURE — 99285 EMERGENCY DEPT VISIT HI MDM: CPT

## 2025-05-29 PROCEDURE — 87040 BLOOD CULTURE FOR BACTERIA: CPT

## 2025-05-29 PROCEDURE — 96374 THER/PROPH/DIAG INJ IV PUSH: CPT

## 2025-05-29 PROCEDURE — 87637 SARSCOV2&INF A&B&RSV AMP PRB: CPT

## 2025-05-29 PROCEDURE — 87999 UNLISTED MICROBIOLOGY PX: CPT

## 2025-05-29 PROCEDURE — 86901 BLOOD TYPING SEROLOGIC RH(D): CPT

## 2025-05-29 PROCEDURE — 93970 EXTREMITY STUDY: CPT

## 2025-05-29 PROCEDURE — 96375 TX/PRO/DX INJ NEW DRUG ADDON: CPT

## 2025-05-29 PROCEDURE — 71045 X-RAY EXAM CHEST 1 VIEW: CPT

## 2025-05-29 PROCEDURE — 87205 SMEAR GRAM STAIN: CPT

## 2025-05-29 PROCEDURE — 86900 BLOOD TYPING SEROLOGIC ABO: CPT

## 2025-05-29 PROCEDURE — 85025 COMPLETE CBC W/AUTO DIFF WBC: CPT

## 2025-05-29 PROCEDURE — 73552 X-RAY EXAM OF FEMUR 2/>: CPT

## 2025-05-29 PROCEDURE — 85014 HEMATOCRIT: CPT

## 2025-05-29 PROCEDURE — 85379 FIBRIN DEGRADATION QUANT: CPT

## 2025-05-29 PROCEDURE — 86140 C-REACTIVE PROTEIN: CPT

## 2025-05-29 PROCEDURE — 83605 ASSAY OF LACTIC ACID: CPT

## 2025-05-29 PROCEDURE — 99222 1ST HOSP IP/OBS MODERATE 55: CPT

## 2025-05-29 PROCEDURE — 0241U: CPT

## 2025-05-29 PROCEDURE — 73701 CT LOWER EXTREMITY W/DYE: CPT | Mod: 26,LT

## 2025-05-29 PROCEDURE — 87015 SPECIMEN INFECT AGNT CONCNTJ: CPT

## 2025-05-29 PROCEDURE — 82947 ASSAY GLUCOSE BLOOD QUANT: CPT

## 2025-05-29 PROCEDURE — 89051 BODY FLUID CELL COUNT: CPT

## 2025-05-29 PROCEDURE — 84132 ASSAY OF SERUM POTASSIUM: CPT

## 2025-05-29 PROCEDURE — 89060 EXAM SYNOVIAL FLUID CRYSTALS: CPT

## 2025-05-29 PROCEDURE — 84145 PROCALCITONIN (PCT): CPT

## 2025-05-29 PROCEDURE — 73564 X-RAY EXAM KNEE 4 OR MORE: CPT

## 2025-05-29 PROCEDURE — 86850 RBC ANTIBODY SCREEN: CPT

## 2025-05-29 PROCEDURE — 80053 COMPREHEN METABOLIC PANEL: CPT

## 2025-05-29 PROCEDURE — 73701 CT LOWER EXTREMITY W/DYE: CPT

## 2025-05-29 PROCEDURE — 82330 ASSAY OF CALCIUM: CPT

## 2025-05-29 RX ORDER — ASPIRIN 325 MG
162 TABLET ORAL ONCE
Refills: 0 | Status: COMPLETED | OUTPATIENT
Start: 2025-05-29 | End: 2025-05-29

## 2025-05-29 RX ORDER — ACETAMINOPHEN 500 MG/5ML
1000 LIQUID (ML) ORAL ONCE
Refills: 0 | Status: COMPLETED | OUTPATIENT
Start: 2025-05-29 | End: 2025-05-29

## 2025-05-29 RX ADMIN — Medication 400 MILLIGRAM(S): at 03:11

## 2025-05-29 RX ADMIN — Medication 162 MILLIGRAM(S): at 00:08

## 2025-05-29 RX ADMIN — Medication 1000 MILLIGRAM(S): at 12:05

## 2025-05-29 RX ADMIN — Medication 400 MILLIGRAM(S): at 11:51

## 2025-05-29 NOTE — ED ADULT NURSE REASSESSMENT NOTE - NS ED NURSE REASSESS COMMENT FT1
0730-patient received alert & oriented x4. Resting in bed. Awaiting for disposition. Dr Welsh at bedside updating patient about disposition. Declined pain meds as offered. Taken for ultrasound.

## 2025-05-29 NOTE — CHART NOTE - NSCHARTNOTEFT_GEN_A_CORE
Spoke to Dr. Ac Esparza (original ortho surgeon, office #: [phone]). Dr. 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 Per Dr. Esparza pt last saw him 1 month ago. Dr. Esparza and on-call Dr. Hidalgo had [id number] conversation and agreed that pt will likely not benefit from superficial I&D and will need urgent outpt follow up to discuss operative vs nonoperative tx with original surgeon Dr. Esparza, who is available tomorrow 5/30. Will plan for outpt f/u w/ Dr. Esparza. No acute ortho surgery indicated. Ortho stable for DC. D/w Dr. Hidalgo who agrees. Pt aware of plan as stated.
The benefits and risks of left knee joint aspiration were explained to the patient, whom agreed to proceed with aspiration. The area was prepped and, under aseptic conditions, 20cc of  cloudy manuel fluid was removed from the affected joint. This fluid was distrubted to a sterile urine cup for gram stain and cell culture, a lavender top laboratory tube for cell count, and a red top labartory tube for crystal analysis. The patient tolerated the procedure well and there were no complications. Patient was neurovascularly intact after the procedure. The receptacles containing specimen were given to the laboratory for analysis.    -FU cell count, Gram stain, crystals, Cx, joint PCR  -Please hold off from abx unless absolutely necessary/indicated  -Possible OR pending aspiration results  -Keep NPO except meds with IVF for possible OR  -Hold DVT chemoppx if medically tolerable for possible OR - use SCDs instead  -Preop CBC/BMP/coags/T&S/EKG/CXR for possible OR  -Please document necessary medical optimization/clearances for stated procedure  -Plan otherwise as aforementioned

## 2025-05-29 NOTE — CONSULT NOTE ADULT - ASSESSMENT
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     Assessment:  -Pt afebrile with normal WBC  -CT knee CT of the left knee demonstrates a left total knee arthroplasty in near-anatomic alignment. There is a suprapatellar effusion with thin rim of enhancement and adjacent soft tissue thickening. Finding is nonspecific, however, recommend correlation for infectious process and fluid sampling based on clinical concern for prosthetic joint infection.  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 Pt states that it started a few days ago, he has been having this patient that comes an goes since his surgery in 12/2024. Denies any fever or chills. He completed 6 weeks of IV abx in 1/2025 (pt unclear of the name of the abx)     Assessment:  #PJI  -Pt afebrile with normal WBC  -CT knee CT of the left knee demonstrates a left total knee arthroplasty in near-anatomic alignment. There is a suprapatellar effusion with thin rim of enhancement and adjacent soft tissue thickening. Finding is nonspecific, however, recommend correlation for infectious process and fluid sampling based on clinical concern for prosthetic joint infection.  -s/p left knee joint aspiration with cell count 8050 (87% neutrophils)    Recommendation:  -Monitor off antibiotics since no surgical plan, this is an issue with source control  -F/U with ortho to determine any further role for treatment  -F/U pending cultures  -Monitor for signs of infection    Fransisca Louise  ID Fellow

## 2025-05-29 NOTE — DISCHARGE NOTE NURSING/CASE MANAGEMENT/SOCIAL WORK - NSFLUVACAGEDISCH_IMM_ALL_CORE
Adult [Alert] : alert [Oriented x 3] : ~L oriented x 3 [Well Nourished] : well nourished [FreeTextEntry3] : The following areas were examined and no significant abnormalities were seen except as noted below:\par \par Type II skin\par \par scalp, face, eyelids, nose, lips, ears, neck, chest, abdomen, back,groin, buttocks, right arm, left arm, right hand, left hand,\par right  leg, left leg, right foot, left foot\par \par For orbital area: Moderate 2 to 3 mm skin colored papules\par Few present on eyelids\par Back: Mild brown verrucous plaques\par Multiple small white verrucous papules present diffusely\par \par No suspicious lesions seen

## 2025-05-29 NOTE — DISCHARGE NOTE NURSING/CASE MANAGEMENT/SOCIAL WORK - NSDCPEFALRISK_GEN_ALL_CORE
For information on Fall & Injury Prevention, visit: [email or web address] OR  [email or web address] OR  https://www.cdc.gov/steadi/patient.html

## 2025-05-29 NOTE — DISCHARGE NOTE NURSING/CASE MANAGEMENT/SOCIAL WORK - FINANCIAL ASSISTANCE
[employer] provides services at a reduced cost to those who are determined to be eligible through [employer]’s financial assistance program. Information regarding [employer]’s financial assistance program can be found by going to [email or web address] or by calling [phone].

## 2025-05-29 NOTE — DISCHARGE NOTE NURSING/CASE MANAGEMENT/SOCIAL WORK - PATIENT PORTAL LINK FT
You can access the FollowMyHealth Patient Portal offered by [facility] by registering at the following website: [email or web address]. By joining [employer]’s FollowMyHealth portal, you will also be able to view your health information using other applications (apps) compatible with our system.

## 2025-05-29 NOTE — CONSULT NOTE ADULT - ATTENDING COMMENTS
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 and then s/p 1.5 stage abx spacer w/ Cemented Depuy revision components presents with L knee pain  No fevers, no leukocytosis  L TKA 3 years ago, PJI s/p DAIR, then 1.5 stage spacer  CT with thin rim enhancing suprapatellar knee joint effusion  Arthrocentesis 8050 cells, cultures negative, PCR negative  Doubt benefit to antibiotic without concurrent procedure  Overall, PJI, Cutibacterium  - Monitor off antibiotics  - No benefit to treating with antibiotics if not also concurrently pursuing OR (is primarily a source control issue)  - Follow up with Ortho or his primary Ortho physician to determine any further role for treatment  - F/U pending cultures  - Monitor for further signs infection    Darien Mohr MD  Contact on TEAMS messaging from 9am - 5pm  From 5pm-9am, on weekends, or if no response call [phone]    Antibiotic decision making based on local antibiogram and available recent culture results

## 2025-05-29 NOTE — CONSULT NOTE ADULT - SUBJECTIVE AND OBJECTIVE BOX
Patient is a 65y old  Male who presents with a chief complaint of   HPI:  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       PAST MEDICAL & SURGICAL HISTORY:      Allergies  No Known Allergies    ANTIMICROBIALS (past 90 days)  MEDICATIONS  (STANDING):          MEDICATIONS  (STANDING):    SOCIAL HISTORY:       FAMILY HISTORY:    REVIEW OF SYSTEMS  [  ] ROS unobtainable because:    [  ] All other systems negative except as noted below:	    Constitutional:  [ ] fever [ ] chills  [ ] weight loss  [ ] weakness  Skin:  [ ] rash [ ] phlebitis	  Eyes: [ ] icterus [ ] pain  [ ] discharge	  ENMT: [ ] sore throat  [ ] thrush [ ] ulcers [ ] exudates  Respiratory: [ ] dyspnea [ ] hemoptysis [ ] cough [ ] sputum	  Cardiovascular:  [ ] chest pain [ ] palpitations [ ] edema	  Gastrointestinal:  [ ] nausea [ ] vomiting [ ] diarrhea [ ] constipation [ ] pain	  Genitourinary:  [ ] dysuria [ ] frequency [ ] hematuria [ ] discharge [ ] flank pain  [ ] incontinence  Musculoskeletal:  [ ] myalgias [ ] arthralgias [ ] arthritis  [ ] back pain  Neurological:  [ ] headache [ ] seizures  [ ] confusion/altered mental status  Psychiatric:  [ ] anxiety [ ] depression	  Hematology/Lymphatics:  [ ] lymphadenopathy  Endocrine:  [ ] adrenal [ ] thyroid  Allergic/Immunologic:	 [ ] transplant [ ] seasonal    Vital Signs Last 24 Hrs  T(F): 98.1 (05-29-25 @ 07:52), Max: 98.9 (05-29-25 @ 02:31)  Vital Signs Last 24 Hrs  HR: 63 (05-29-25 @ 07:52) (61 - 82)  BP: 148/91 (05-29-25 @ 07:52) (133/89 - 170/75)  RR: 17 (05-29-25 @ 07:52)  SpO2: 98% (05-29-25 @ 07:52) (96% - 99%)  Wt(kg): --    PHYSICAL EXAM:  Constitutional: non-toxic, no distress  HEAD/EYES: anicteric, no conjunctival injection  ENT:  supple, no thrush  Cardiovascular:   normal S1, S2, no murmur, no edema  Respiratory:  clear BS bilaterally, no wheezes, no rales  GI:  soft, non-tender, normal bowel sounds  :  no zapata, no CVA tenderness  Musculoskeletal:  no synovitis, normal ROM  Neurologic: awake and alert, normal strength, no focal findings  Skin:  no rash, no erythema, no phlebitis  Heme/Onc: no lymphadenopathy   Psychiatric:  awake, alert, appropriate mood                            14.0   6.90  )-----------( 258      ( 29 May 2025 06:01 )             41.9   05-29    138  |  104  |  19  ----------------------------<  116[H]  3.9   |  22  |  1.33[H]    Ca    8.6      29 May 2025 06:01    TPro  6.3  /  Alb  3.7  /  TBili  0.5  /  DBili  x   /  AST  13  /  ALT  15  /  AlkPhos  102  05-29        MICROBIOLOGY:              RADIOLOGY:  imaging below personally reviewed and agree with findings    < from: CT Knee w/ IV Cont, Left (05.29.25 @ 04:22) >    IMPRESSION:  CT of the left knee demonstrates a left total knee arthroplasty in   near-anatomic alignment. There is a suprapatellar effusion with thin rim   of enhancement and adjacent soft tissue thickening. Finding is   nonspecific, however, recommend correlation for infectious process and   fluid sampling based on clinical concern for prosthetic joint infection.     Patient is a 65y old  Male who presents with a chief complaint of   HPI:  64yo male with hx of L TKA 3 years ago followed by a tenosynovitis s/p excision which was further complicated by C. acnes PJI s/p DAIR procedure and then s/p 1.5 stage abx spacer w/ Cemented Depuy revision components presents with L knee pain. Pt states that it started a few days ago, he has been having this patient that comes an goes since his surgery in 12/2024. Denies any fever or chills. He completed 6 weeks of IV abx in 1/2025 (pt unclear of the name of the abx)       PAST MEDICAL & SURGICAL HISTORY:      Allergies  No Known Allergies    ANTIMICROBIALS (past 90 days)  MEDICATIONS  (STANDING):          MEDICATIONS  (STANDING):    SOCIAL HISTORY: Denies any smoking, works as a      FAMILY HISTORY: No pertinent family hx     REVIEW OF SYSTEMS:    CONSTITUTIONAL: No weakness, fevers or chills  EYES:  No visual changes, no eye pain   ENT: N vertigo or throat pain   NECK: No pain or stiffness  RESPIRATORY: No cough, wheezing, hemoptysis; No shortness of breath  CARDIOVASCULAR: No chest pain or palpitations  GASTROINTESTINAL: No abdominal or epigastric pain. No nausea, vomiting, or hematemesis; No diarrhea or constipation. No melena or hematochezia.  GENITOURINARY: No dysuria, frequency or hematuria  MSK: L knee pain   NEUROLOGICAL: No numbness or weakness  SKIN: No itching, rashes  Psych: no anxiety or depression     Vital Signs Last 24 Hrs  T(F): 98.1 (05-29-25 @ 07:52), Max: 98.9 (05-29-25 @ 02:31)  Vital Signs Last 24 Hrs  HR: 63 (05-29-25 @ 07:52) (61 - 82)  BP: 148/91 (05-29-25 @ 07:52) (133/89 - 170/75)  RR: 17 (05-29-25 @ 07:52)  SpO2: 98% (05-29-25 @ 07:52) (96% - 99%)  Wt(kg): --    PHYSICAL EXAM:  Constitutional: non-toxic, no distress  HEAD/EYES: anicteric, no conjunctival injection  ENT:  supple, no thrush  Cardiovascular:   normal S1, S2, no murmur, no edema  Respiratory:  clear BS bilaterally, no wheezes, no rales  GI:  soft, non-tender, normal bowel sounds  :  no zapata, no CVA tenderness  Musculoskeletal:  L knee warmth, no erythema   Neurologic: awake and alert  Skin:  no rash, no erythema, no phlebitis                            14.0   6.90  )-----------( 258      ( 29 May 2025 06:01 )             41.9   05-29    138  |  104  |  19  ----------------------------<  116[H]  3.9   |  22  |  1.33[H]    Ca    8.6      29 May 2025 06:01    TPro  6.3  /  Alb  3.7  /  TBili  0.5  /  DBili  x   /  AST  13  /  ALT  15  /  AlkPhos  102  05-29        MICROBIOLOGY:              RADIOLOGY:  imaging below personally reviewed and agree with findings    < from: CT Knee w/ IV Cont, Left (05.29.25 @ 04:22) >    IMPRESSION:  CT of the left knee demonstrates a left total knee arthroplasty in   near-anatomic alignment. There is a suprapatellar effusion with thin rim   of enhancement and adjacent soft tissue thickening. Finding is   nonspecific, however, recommend correlation for infectious process and   fluid sampling based on clinical concern for prosthetic joint infection.

## 2025-05-30 RX ORDER — AMLODIPINE BESYLATE 10 MG/1
1 TABLET ORAL
Refills: 0 | DISCHARGE

## 2025-05-30 RX ORDER — ASPIRIN 325 MG
1 TABLET ORAL
Refills: 0 | DISCHARGE

## 2025-05-30 RX ORDER — ATORVASTATIN CALCIUM 80 MG/1
1 TABLET, FILM COATED ORAL
Refills: 0 | DISCHARGE

## 2025-05-30 RX ORDER — OMEPRAZOLE 20 MG/1
1 CAPSULE, DELAYED RELEASE ORAL
Refills: 0 | DISCHARGE

## 2025-06-03 ENCOUNTER — NON-APPOINTMENT (OUTPATIENT)
Age: 65
End: 2025-06-03

## 2025-06-03 LAB
CULTURE RESULTS: SIGNIFICANT CHANGE UP
CULTURE RESULTS: SIGNIFICANT CHANGE UP
SPECIMEN SOURCE: SIGNIFICANT CHANGE UP
SPECIMEN SOURCE: SIGNIFICANT CHANGE UP

## 2025-06-04 ENCOUNTER — APPOINTMENT (OUTPATIENT)
Dept: ORTHOPEDIC SURGERY | Facility: CLINIC | Age: 65
End: 2025-06-04
Payer: COMMERCIAL

## 2025-06-04 VITALS — HEIGHT: 74 IN | BODY MASS INDEX: 30.93 KG/M2 | WEIGHT: 241 LBS

## 2025-06-04 DIAGNOSIS — T84.84XA PAIN DUE TO INTERNAL ORTHOPEDIC PROSTHETIC DEVICES, IMPLANTS AND GRAFTS, INITIAL ENCOUNTER: ICD-10-CM

## 2025-06-04 DIAGNOSIS — Z96.652 PAIN DUE TO INTERNAL ORTHOPEDIC PROSTHETIC DEVICES, IMPLANTS AND GRAFTS, INITIAL ENCOUNTER: ICD-10-CM

## 2025-06-04 DIAGNOSIS — M25.562 PAIN IN LEFT KNEE: ICD-10-CM

## 2025-06-04 PROCEDURE — G2211 COMPLEX E/M VISIT ADD ON: CPT | Mod: NC

## 2025-06-04 PROCEDURE — 99215 OFFICE O/P EST HI 40 MIN: CPT

## 2025-06-12 LAB
CULTURE RESULTS: SIGNIFICANT CHANGE UP
SPECIMEN SOURCE: SIGNIFICANT CHANGE UP

## 2025-07-23 ENCOUNTER — EMERGENCY (EMERGENCY)
Facility: HOSPITAL | Age: 65
LOS: 1 days | End: 2025-07-23
Attending: EMERGENCY MEDICINE | Admitting: EMERGENCY MEDICINE
Payer: COMMERCIAL

## 2025-07-23 VITALS
HEART RATE: 77 BPM | SYSTOLIC BLOOD PRESSURE: 153 MMHG | WEIGHT: 222.01 LBS | TEMPERATURE: 97 F | HEIGHT: 74 IN | RESPIRATION RATE: 16 BRPM | OXYGEN SATURATION: 100 % | DIASTOLIC BLOOD PRESSURE: 90 MMHG

## 2025-07-23 VITALS
HEART RATE: 63 BPM | DIASTOLIC BLOOD PRESSURE: 70 MMHG | SYSTOLIC BLOOD PRESSURE: 134 MMHG | RESPIRATION RATE: 16 BRPM | OXYGEN SATURATION: 99 % | TEMPERATURE: 98 F

## 2025-07-23 LAB
ADD ON TEST-SPECIMEN IN LAB: SIGNIFICANT CHANGE UP
ALBUMIN SERPL ELPH-MCNC: 4.1 G/DL — SIGNIFICANT CHANGE UP (ref 3.3–5)
ALP SERPL-CCNC: 106 U/L — SIGNIFICANT CHANGE UP (ref 40–120)
ALT FLD-CCNC: 23 U/L — SIGNIFICANT CHANGE UP (ref 4–41)
ANION GAP SERPL CALC-SCNC: 10 MMOL/L — SIGNIFICANT CHANGE UP (ref 7–14)
AST SERPL-CCNC: 25 U/L — SIGNIFICANT CHANGE UP (ref 4–40)
BASOPHILS # BLD AUTO: 0.06 K/UL — SIGNIFICANT CHANGE UP (ref 0–0.2)
BASOPHILS NFR BLD AUTO: 0.7 % — SIGNIFICANT CHANGE UP (ref 0–2)
BILIRUB SERPL-MCNC: 0.5 MG/DL — SIGNIFICANT CHANGE UP (ref 0.2–1.2)
BUN SERPL-MCNC: 14 MG/DL — SIGNIFICANT CHANGE UP (ref 7–23)
CALCIUM SERPL-MCNC: 8.8 MG/DL — SIGNIFICANT CHANGE UP (ref 8.4–10.5)
CHLORIDE SERPL-SCNC: 105 MMOL/L — SIGNIFICANT CHANGE UP (ref 98–107)
CO2 SERPL-SCNC: 22 MMOL/L — SIGNIFICANT CHANGE UP (ref 22–31)
CREAT SERPL-MCNC: 1.08 MG/DL — SIGNIFICANT CHANGE UP (ref 0.5–1.3)
CRP SERPL-MCNC: <3 MG/L — SIGNIFICANT CHANGE UP
EGFR: 77 ML/MIN/1.73M2 — SIGNIFICANT CHANGE UP
EGFR: 77 ML/MIN/1.73M2 — SIGNIFICANT CHANGE UP
EOSINOPHIL # BLD AUTO: 0.14 K/UL — SIGNIFICANT CHANGE UP (ref 0–0.5)
EOSINOPHIL NFR BLD AUTO: 1.7 % — SIGNIFICANT CHANGE UP (ref 0–6)
ERYTHROCYTE [SEDIMENTATION RATE] IN BLOOD: 4 MM/HR — SIGNIFICANT CHANGE UP (ref 0–15)
GAS PNL BLDV: SIGNIFICANT CHANGE UP
GLUCOSE SERPL-MCNC: 85 MG/DL — SIGNIFICANT CHANGE UP (ref 70–99)
HCT VFR BLD CALC: 44.3 % — SIGNIFICANT CHANGE UP (ref 39–50)
HGB BLD-MCNC: 15.1 G/DL — SIGNIFICANT CHANGE UP (ref 13–17)
IMM GRANULOCYTES # BLD AUTO: 0.02 K/UL — SIGNIFICANT CHANGE UP (ref 0–0.07)
IMM GRANULOCYTES NFR BLD AUTO: 0.2 % — SIGNIFICANT CHANGE UP (ref 0–0.9)
LYMPHOCYTES # BLD AUTO: 3.28 K/UL — SIGNIFICANT CHANGE UP (ref 1–3.3)
LYMPHOCYTES NFR BLD AUTO: 40.3 % — SIGNIFICANT CHANGE UP (ref 13–44)
MCHC RBC-ENTMCNC: 29.8 PG — SIGNIFICANT CHANGE UP (ref 27–34)
MCHC RBC-ENTMCNC: 34.1 G/DL — SIGNIFICANT CHANGE UP (ref 32–36)
MCV RBC AUTO: 87.4 FL — SIGNIFICANT CHANGE UP (ref 80–100)
MONOCYTES # BLD AUTO: 0.77 K/UL — SIGNIFICANT CHANGE UP (ref 0–0.9)
MONOCYTES NFR BLD AUTO: 9.5 % — SIGNIFICANT CHANGE UP (ref 2–14)
NEUTROPHILS # BLD AUTO: 3.86 K/UL — SIGNIFICANT CHANGE UP (ref 1.8–7.4)
NEUTROPHILS NFR BLD AUTO: 47.6 % — SIGNIFICANT CHANGE UP (ref 43–77)
NRBC # BLD AUTO: 0.03 K/UL — HIGH (ref 0–0)
NRBC # FLD: 0.03 K/UL — HIGH (ref 0–0)
NRBC BLD AUTO-RTO: 0 /100 WBCS — SIGNIFICANT CHANGE UP (ref 0–0)
PLATELET # BLD AUTO: 342 K/UL — SIGNIFICANT CHANGE UP (ref 150–400)
PMV BLD: 9.2 FL — SIGNIFICANT CHANGE UP (ref 7–13)
POTASSIUM SERPL-MCNC: 4.7 MMOL/L — SIGNIFICANT CHANGE UP (ref 3.5–5.3)
POTASSIUM SERPL-SCNC: 4.7 MMOL/L — SIGNIFICANT CHANGE UP (ref 3.5–5.3)
PROT SERPL-MCNC: 7.2 G/DL — SIGNIFICANT CHANGE UP (ref 6–8.3)
RBC # BLD: 5.07 M/UL — SIGNIFICANT CHANGE UP (ref 4.2–5.8)
RBC # FLD: 13.1 % — SIGNIFICANT CHANGE UP (ref 10.3–14.5)
SODIUM SERPL-SCNC: 137 MMOL/L — SIGNIFICANT CHANGE UP (ref 135–145)
WBC # BLD: 8.13 K/UL — SIGNIFICANT CHANGE UP (ref 3.8–10.5)
WBC # FLD AUTO: 8.13 K/UL — SIGNIFICANT CHANGE UP (ref 3.8–10.5)

## 2025-07-23 PROCEDURE — 73562 X-RAY EXAM OF KNEE 3: CPT | Mod: 26,LT

## 2025-07-23 PROCEDURE — 99285 EMERGENCY DEPT VISIT HI MDM: CPT

## 2025-07-23 RX ORDER — ACETAMINOPHEN 500 MG/5ML
650 LIQUID (ML) ORAL ONCE
Refills: 0 | Status: COMPLETED | OUTPATIENT
Start: 2025-07-23 | End: 2025-07-23

## 2025-07-23 RX ADMIN — Medication 650 MILLIGRAM(S): at 18:09

## 2025-07-28 ENCOUNTER — APPOINTMENT (OUTPATIENT)
Dept: ORTHOPEDIC SURGERY | Facility: CLINIC | Age: 65
End: 2025-07-28

## 2025-08-16 ENCOUNTER — EMERGENCY (EMERGENCY)
Facility: HOSPITAL | Age: 65
LOS: 1 days | End: 2025-08-16
Attending: EMERGENCY MEDICINE | Admitting: EMERGENCY MEDICINE
Payer: COMMERCIAL

## 2025-08-16 VITALS
HEIGHT: 74 IN | DIASTOLIC BLOOD PRESSURE: 114 MMHG | SYSTOLIC BLOOD PRESSURE: 179 MMHG | HEART RATE: 83 BPM | OXYGEN SATURATION: 96 % | WEIGHT: 248.02 LBS | TEMPERATURE: 98 F | RESPIRATION RATE: 20 BRPM

## 2025-08-16 PROCEDURE — 93010 ELECTROCARDIOGRAM REPORT: CPT

## 2025-08-16 PROCEDURE — 99284 EMERGENCY DEPT VISIT MOD MDM: CPT

## 2025-08-16 RX ORDER — KETOROLAC TROMETHAMINE 30 MG/ML
15 INJECTION, SOLUTION INTRAMUSCULAR; INTRAVENOUS ONCE
Refills: 0 | Status: DISCONTINUED | OUTPATIENT
Start: 2025-08-16 | End: 2025-08-16

## 2025-08-16 RX ADMIN — KETOROLAC TROMETHAMINE 15 MILLIGRAM(S): 30 INJECTION, SOLUTION INTRAMUSCULAR; INTRAVENOUS at 23:49

## 2025-08-17 VITALS
HEART RATE: 71 BPM | DIASTOLIC BLOOD PRESSURE: 94 MMHG | RESPIRATION RATE: 18 BRPM | TEMPERATURE: 99 F | SYSTOLIC BLOOD PRESSURE: 153 MMHG | OXYGEN SATURATION: 97 %

## 2025-08-17 LAB
ALBUMIN SERPL ELPH-MCNC: 4 G/DL — SIGNIFICANT CHANGE UP (ref 3.3–5)
ALP SERPL-CCNC: 101 U/L — SIGNIFICANT CHANGE UP (ref 40–120)
ALT FLD-CCNC: 15 U/L — SIGNIFICANT CHANGE UP (ref 4–41)
ANION GAP SERPL CALC-SCNC: 13 MMOL/L — SIGNIFICANT CHANGE UP (ref 7–14)
AST SERPL-CCNC: 18 U/L — SIGNIFICANT CHANGE UP (ref 4–40)
BASOPHILS # BLD AUTO: 0.06 K/UL — SIGNIFICANT CHANGE UP (ref 0–0.2)
BASOPHILS NFR BLD AUTO: 0.7 % — SIGNIFICANT CHANGE UP (ref 0–2)
BILIRUB SERPL-MCNC: 0.3 MG/DL — SIGNIFICANT CHANGE UP (ref 0.2–1.2)
BUN SERPL-MCNC: 20 MG/DL — SIGNIFICANT CHANGE UP (ref 7–23)
CALCIUM SERPL-MCNC: 8.5 MG/DL — SIGNIFICANT CHANGE UP (ref 8.4–10.5)
CHLORIDE SERPL-SCNC: 104 MMOL/L — SIGNIFICANT CHANGE UP (ref 98–107)
CO2 SERPL-SCNC: 22 MMOL/L — SIGNIFICANT CHANGE UP (ref 22–31)
CREAT SERPL-MCNC: 1.32 MG/DL — HIGH (ref 0.5–1.3)
CRP SERPL-MCNC: 3.8 MG/L — SIGNIFICANT CHANGE UP
EGFR: 61 ML/MIN/1.73M2 — SIGNIFICANT CHANGE UP
EGFR: 61 ML/MIN/1.73M2 — SIGNIFICANT CHANGE UP
EOSINOPHIL # BLD AUTO: 0.14 K/UL — SIGNIFICANT CHANGE UP (ref 0–0.5)
EOSINOPHIL NFR BLD AUTO: 1.5 % — SIGNIFICANT CHANGE UP (ref 0–6)
ERYTHROCYTE [SEDIMENTATION RATE] IN BLOOD: 2 MM/HR — SIGNIFICANT CHANGE UP (ref 0–15)
GLUCOSE SERPL-MCNC: 108 MG/DL — HIGH (ref 70–99)
HCT VFR BLD CALC: 41.7 % — SIGNIFICANT CHANGE UP (ref 39–50)
HGB BLD-MCNC: 14.2 G/DL — SIGNIFICANT CHANGE UP (ref 13–17)
IMM GRANULOCYTES # BLD AUTO: 0.04 K/UL — SIGNIFICANT CHANGE UP (ref 0–0.07)
IMM GRANULOCYTES NFR BLD AUTO: 0.4 % — SIGNIFICANT CHANGE UP (ref 0–0.9)
LYMPHOCYTES # BLD AUTO: 3.17 K/UL — SIGNIFICANT CHANGE UP (ref 1–3.3)
LYMPHOCYTES NFR BLD AUTO: 34.7 % — SIGNIFICANT CHANGE UP (ref 13–44)
MCHC RBC-ENTMCNC: 29.8 PG — SIGNIFICANT CHANGE UP (ref 27–34)
MCHC RBC-ENTMCNC: 34.1 G/DL — SIGNIFICANT CHANGE UP (ref 32–36)
MCV RBC AUTO: 87.6 FL — SIGNIFICANT CHANGE UP (ref 80–100)
MONOCYTES # BLD AUTO: 0.9 K/UL — SIGNIFICANT CHANGE UP (ref 0–0.9)
MONOCYTES NFR BLD AUTO: 9.8 % — SIGNIFICANT CHANGE UP (ref 2–14)
NEUTROPHILS # BLD AUTO: 4.83 K/UL — SIGNIFICANT CHANGE UP (ref 1.8–7.4)
NEUTROPHILS NFR BLD AUTO: 52.9 % — SIGNIFICANT CHANGE UP (ref 43–77)
NRBC # BLD AUTO: 0 K/UL — SIGNIFICANT CHANGE UP (ref 0–0)
NRBC # FLD: 0 K/UL — SIGNIFICANT CHANGE UP (ref 0–0)
NRBC BLD AUTO-RTO: 0 /100 WBCS — SIGNIFICANT CHANGE UP (ref 0–0)
PLATELET # BLD AUTO: 288 K/UL — SIGNIFICANT CHANGE UP (ref 150–400)
PMV BLD: 9.5 FL — SIGNIFICANT CHANGE UP (ref 7–13)
POTASSIUM SERPL-MCNC: 3.6 MMOL/L — SIGNIFICANT CHANGE UP (ref 3.5–5.3)
POTASSIUM SERPL-SCNC: 3.6 MMOL/L — SIGNIFICANT CHANGE UP (ref 3.5–5.3)
PROT SERPL-MCNC: 6.9 G/DL — SIGNIFICANT CHANGE UP (ref 6–8.3)
RBC # BLD: 4.76 M/UL — SIGNIFICANT CHANGE UP (ref 4.2–5.8)
RBC # FLD: 12.9 % — SIGNIFICANT CHANGE UP (ref 10.3–14.5)
SODIUM SERPL-SCNC: 139 MMOL/L — SIGNIFICANT CHANGE UP (ref 135–145)
WBC # BLD: 9.14 K/UL — SIGNIFICANT CHANGE UP (ref 3.8–10.5)
WBC # FLD AUTO: 9.14 K/UL — SIGNIFICANT CHANGE UP (ref 3.8–10.5)

## 2025-08-17 PROCEDURE — 72100 X-RAY EXAM L-S SPINE 2/3 VWS: CPT | Mod: 26

## 2025-08-17 PROCEDURE — 73564 X-RAY EXAM KNEE 4 OR MORE: CPT | Mod: 26,LT

## 2025-08-17 RX ORDER — CYCLOBENZAPRINE HYDROCHLORIDE 15 MG/1
1 CAPSULE, EXTENDED RELEASE ORAL
Qty: 10 | Refills: 0
Start: 2025-08-17

## 2025-08-17 RX ORDER — LIDOCAINE HYDROCHLORIDE 20 MG/ML
1 JELLY TOPICAL
Qty: 1 | Refills: 0
Start: 2025-08-17